# Patient Record
Sex: MALE | Race: WHITE | Employment: OTHER | URBAN - METROPOLITAN AREA
[De-identification: names, ages, dates, MRNs, and addresses within clinical notes are randomized per-mention and may not be internally consistent; named-entity substitution may affect disease eponyms.]

---

## 2019-08-28 ENCOUNTER — HOSPITAL ENCOUNTER (OUTPATIENT)
Dept: GENERAL RADIOLOGY | Age: 69
Discharge: HOME | End: 2019-08-30
Payer: COMMERCIAL

## 2019-08-28 DIAGNOSIS — N63.0 BREAST LUMP: ICD-10-CM

## 2019-08-28 PROCEDURE — G0279 TOMOSYNTHESIS, MAMMO: HCPCS

## 2019-08-28 PROCEDURE — 76642 ULTRASOUND BREAST LIMITED: CPT

## 2019-09-17 ENCOUNTER — OFFICE VISIT (OUTPATIENT)
Dept: ENT CLINIC | Age: 69
End: 2019-09-17
Payer: COMMERCIAL

## 2019-09-17 VITALS
BODY MASS INDEX: 25.57 KG/M2 | DIASTOLIC BLOOD PRESSURE: 64 MMHG | WEIGHT: 162.9 LBS | HEIGHT: 67 IN | SYSTOLIC BLOOD PRESSURE: 116 MMHG | HEART RATE: 74 BPM

## 2019-09-17 DIAGNOSIS — R49.0 HOARSENESS: Primary | ICD-10-CM

## 2019-09-17 PROCEDURE — 99204 OFFICE O/P NEW MOD 45 MIN: CPT | Performed by: OTOLARYNGOLOGY

## 2019-09-17 RX ORDER — AZELASTINE 1 MG/ML
2 SPRAY, METERED NASAL 2 TIMES DAILY
Qty: 1 BOTTLE | Refills: 1 | Status: SHIPPED
Start: 2019-09-17 | End: 2020-06-19

## 2019-09-17 ASSESSMENT — ENCOUNTER SYMPTOMS
FACIAL SWELLING: 0
SINUS PAIN: 0
EYE PAIN: 0
COUGH: 0
COLOR CHANGE: 0
ABDOMINAL DISTENTION: 0
STRIDOR: 0
SHORTNESS OF BREATH: 0
CONSTIPATION: 0
TROUBLE SWALLOWING: 0

## 2020-06-18 ENCOUNTER — HOSPITAL ENCOUNTER (OUTPATIENT)
Age: 70
Discharge: HOME | End: 2020-06-20
Payer: COMMERCIAL

## 2020-06-18 PROCEDURE — U0003 INFECTIOUS AGENT DETECTION BY NUCLEIC ACID (DNA OR RNA); SEVERE ACUTE RESPIRATORY SYNDROME CORONAVIRUS 2 (SARS-COV-2) (CORONAVIRUS DISEASE [COVID-19]), AMPLIFIED PROBE TECHNIQUE, MAKING USE OF HIGH THROUGHPUT TECHNOLOGIES AS DESCRIBED BY CMS-2020-01-R: HCPCS

## 2020-06-19 ENCOUNTER — ANESTHESIA EVENT (OUTPATIENT)
Dept: ENDOSCOPY | Age: 70
End: 2020-06-19
Payer: COMMERCIAL

## 2020-06-19 LAB
SARS-COV-2: NOT DETECTED
SOURCE: NORMAL

## 2020-06-19 RX ORDER — ASCORBIC ACID 500 MG
1000 TABLET ORAL DAILY
COMMUNITY

## 2020-06-22 ENCOUNTER — ANESTHESIA (OUTPATIENT)
Dept: ENDOSCOPY | Age: 70
End: 2020-06-22
Payer: COMMERCIAL

## 2020-06-22 ENCOUNTER — HOSPITAL ENCOUNTER (OUTPATIENT)
Age: 70
Setting detail: OUTPATIENT SURGERY
Discharge: HOME | End: 2020-06-22
Attending: INTERNAL MEDICINE | Admitting: INTERNAL MEDICINE
Payer: COMMERCIAL

## 2020-06-22 VITALS
WEIGHT: 164.25 LBS | RESPIRATION RATE: 16 BRPM | BODY MASS INDEX: 25.78 KG/M2 | HEIGHT: 67 IN | OXYGEN SATURATION: 99 % | DIASTOLIC BLOOD PRESSURE: 64 MMHG | SYSTOLIC BLOOD PRESSURE: 117 MMHG | HEART RATE: 62 BPM | TEMPERATURE: 96.9 F

## 2020-06-22 VITALS
SYSTOLIC BLOOD PRESSURE: 101 MMHG | OXYGEN SATURATION: 99 % | DIASTOLIC BLOOD PRESSURE: 64 MMHG | RESPIRATION RATE: 15 BRPM

## 2020-06-22 PROCEDURE — 7100000010 HC PHASE II RECOVERY - FIRST 15 MIN: Performed by: INTERNAL MEDICINE

## 2020-06-22 PROCEDURE — 7100000011 HC PHASE II RECOVERY - ADDTL 15 MIN: Performed by: INTERNAL MEDICINE

## 2020-06-22 PROCEDURE — 3700000000 HC ANESTHESIA ATTENDED CARE: Performed by: INTERNAL MEDICINE

## 2020-06-22 PROCEDURE — 2580000003 HC RX 258: Performed by: ANESTHESIOLOGY

## 2020-06-22 PROCEDURE — 3609027000 HC COLONOSCOPY: Performed by: INTERNAL MEDICINE

## 2020-06-22 PROCEDURE — 3700000001 HC ADD 15 MINUTES (ANESTHESIA): Performed by: INTERNAL MEDICINE

## 2020-06-22 PROCEDURE — 2709999900 HC NON-CHARGEABLE SUPPLY: Performed by: INTERNAL MEDICINE

## 2020-06-22 PROCEDURE — 6360000002 HC RX W HCPCS: Performed by: NURSE ANESTHETIST, CERTIFIED REGISTERED

## 2020-06-22 RX ORDER — SODIUM CHLORIDE 0.9 % (FLUSH) 0.9 %
10 SYRINGE (ML) INJECTION EVERY 12 HOURS SCHEDULED
Status: DISCONTINUED | OUTPATIENT
Start: 2020-06-22 | End: 2020-06-22 | Stop reason: HOSPADM

## 2020-06-22 RX ORDER — PROPOFOL 10 MG/ML
INJECTION, EMULSION INTRAVENOUS CONTINUOUS PRN
Status: DISCONTINUED | OUTPATIENT
Start: 2020-06-22 | End: 2020-06-22 | Stop reason: SDUPTHER

## 2020-06-22 RX ORDER — SODIUM CHLORIDE, SODIUM LACTATE, POTASSIUM CHLORIDE, CALCIUM CHLORIDE 600; 310; 30; 20 MG/100ML; MG/100ML; MG/100ML; MG/100ML
INJECTION, SOLUTION INTRAVENOUS CONTINUOUS
Status: DISCONTINUED | OUTPATIENT
Start: 2020-06-22 | End: 2020-06-22 | Stop reason: HOSPADM

## 2020-06-22 RX ADMIN — SODIUM CHLORIDE, POTASSIUM CHLORIDE, SODIUM LACTATE AND CALCIUM CHLORIDE: 600; 310; 30; 20 INJECTION, SOLUTION INTRAVENOUS at 08:21

## 2020-06-22 RX ADMIN — PROPOFOL 100 MCG/KG/MIN: 10 INJECTION, EMULSION INTRAVENOUS at 09:36

## 2020-06-22 ASSESSMENT — PAIN SCALES - GENERAL
PAINLEVEL_OUTOF10: 0
PAINLEVEL_OUTOF10: 0

## 2020-06-22 ASSESSMENT — PAIN - FUNCTIONAL ASSESSMENT: PAIN_FUNCTIONAL_ASSESSMENT: 0-10

## 2020-06-22 NOTE — H&P
deficits  Skin:  No rashes, lesions or wounds    DATA:  CBC with Differential:  No results found for: WBC, RBC, HGB, HCT, PLT, MCV, MCH, MCHC, RDW, NRBC, SEGSPCT, BANDSPCT, BLASTSPCT, METASPCT, LYMPHOPCT, PROMYELOPCT, MONOPCT, MYELOPCT, EOSPCT, BASOPCT, MONOSABS, LYMPHSABS, EOSABS, BASOSABS, DIFFTYPE  CMP:  No results found for: NA, K, CL, CO2, BUN, CREATININE, GFRAA, AGRATIO, LABGLOM, GLUCOSE, PROT, LABALBU, CALCIUM, BILITOT, ALKPHOS, AST, ALT  Magnesium:  No results found for: MG  Phosphorus:  No results found for: PHOS  PT/INR:  No results found for: PROTIME, INR  Troponin:  No results found for: TROPONINI  U/A:  No results found for: NITRITE, COLORU, PROTEINU, PHUR, LABCAST, WBCUA, RBCUA, MUCUS, TRICHOMONAS, YEAST, BACTERIA, CLARITYU, SPECGRAV, LEUKOCYTESUR, UROBILINOGEN, BILIRUBINUR, BLOODU, GLUCOSEU, AMORPHOUS  ABG:  No results found for: PH, PCO2, PO2, HCO3, BE, THGB, TCO2, O2SAT  HgBA1c:  No results found for: LABA1C  FLP:  No results found for: TRIG, HDL, LDLCALC, LDLDIRECT, LABVLDL  TSH:  No results found for: TSH  IRON:  No results found for: IRON  LIPASE:  No results found for: LIPASE    ASSESSMENT AND PLAN:      1.   History of tubular adenoma of the colon 2017    Plan:  Colonoscopy with MAC with possible polypectomy        Dominick Cavazos D.O.  6/22/2020  8:57 AM

## 2020-06-22 NOTE — ANESTHESIA PRE PROCEDURE
Department of Anesthesiology  Preprocedure Note       Name:  Kim Suarez   Age:  72 y.o.  :  1950                                          MRN:  [id number]         Date:  2020      Surgeon: Brendon Murray):  Christine Merida DO    Procedure: Procedure(s):  COLONOSCOPY    Medications prior to admission:   Prior to Admission medications    Medication Sig Start Date End Date Taking? Authorizing Provider   vitamin C (ASCORBIC ACID) 500 MG tablet Take 1,000 mg by mouth daily   Yes Historical Provider, MD   Cholecalciferol (VITAMIN D) 2000 UNITS CAPS capsule Take 1 capsule by mouth daily   Yes Historical Provider, MD   Multiple Vitamin (MULTI VITAMIN MENS PO) Take 1 tablet by mouth daily. Yes Historical Provider, MD       Current medications:    Current Facility-Administered Medications   Medication Dose Route Frequency Provider Last Rate Last Dose    sodium chloride flush 0.9 % injection 10 mL  10 mL Intravenous 2 times per day Christine Merida DO        lactated ringers infusion   Intravenous Continuous Loi MD Chantel 100 mL/hr at 20 1221         Allergies: Allergies   Allergen Reactions    Penicillins Nausea And Vomiting     hives       Problem List:  There is no problem list on file for this patient.       Past Medical History:        Diagnosis Date    Gout        Past Surgical History:        Procedure Laterality Date    COLONOSCOPY  1/28/2013     x  3    COLONOSCOPY  [id number] polyp    HERNIA REPAIR Right 2017    inguinal    HERNIA REPAIR      umbilical    KNEE ARTHROSCOPY      both knees       Social History:    Social History     Tobacco Use    Smoking status: Never Smoker    Smokeless tobacco: Never Used   Substance Use Topics    Alcohol use: Yes     Comment: rarely- wine occassionally                                Counseling given: Not Answered      Vital Signs (Current):   Vitals:    20 1151 20 0804   BP:  (!) 141/77   Pulse:  76   Resp:  16 Temp:  97.4 °F (36.3 °C)   TempSrc:  Temporal   SpO2:  100%   Weight: 170 lb (77.1 kg) 164 lb 4 oz (74.5 kg)   Height: 5' 7\" (1.702 m) 5' 7\" (1.702 m)                                              BP Readings from Last 3 Encounters:   06/22/20 (!) 141/77   09/17/19 116/64   08/02/17 120/70       NPO Status: Time of last liquid consumption: 2200                        Time of last solid consumption: 2300                        Date of last liquid consumption: 06/21/20                        Date of last solid food consumption: 06/20/20    BMI:   Wt Readings from Last 3 Encounters:   06/22/20 164 lb 4 oz (74.5 kg)   09/17/19 162 lb 14.4 oz (73.9 kg)   08/02/17 162 lb 8 oz (73.7 kg)     Body mass index is 25.73 kg/m². CBC: No results found for: WBC, RBC, HGB, HCT, MCV, RDW, PLT    CMP: No results found for: NA, K, CL, CO2, BUN, CREATININE, GFRAA, AGRATIO, LABGLOM, GLUCOSE, PROT, CALCIUM, BILITOT, ALKPHOS, AST, ALT    POC Tests: No results for input(s): POCGLU, POCNA, POCK, POCCL, POCBUN, POCHEMO, POCHCT in the last 72 hours.     Coags: No results found for: PROTIME, INR, APTT    HCG (If Applicable): No results found for: PREGTESTUR, PREGSERUM, HCG, HCGQUANT     ABGs: No results found for: PHART, PO2ART, FGD8ZSK, YHT5ULY, BEART, J6GMGFEA     Type & Screen (If Applicable):  No results found for: LABABO, LABRH    Drug/Infectious Status (If Applicable):  No results found for: HIV, HEPCAB    COVID-19 Screening (If Applicable):   Lab Results   Component Value Date    COVID19 Not Detected 06/18/2020         Anesthesia Evaluation  Patient summary reviewed no history of anesthetic complications:   Airway: Mallampati: II  TM distance: >3 FB   Neck ROM: full  Mouth opening: > = 3 FB Dental:          Pulmonary:Negative Pulmonary ROS breath sounds clear to auscultation                             Cardiovascular:  Exercise tolerance: good (>4 METS),         ECG reviewed  Rhythm: regular  Rate: normal                 ROS comment:

## 2021-09-15 ENCOUNTER — HOSPITAL ENCOUNTER (OUTPATIENT)
Dept: ULTRASOUND IMAGING | Age: 71
Discharge: HOME | End: 2021-09-15
Payer: COMMERCIAL

## 2021-09-15 ENCOUNTER — HOSPITAL ENCOUNTER (OUTPATIENT)
Dept: PULMONOLOGY | Age: 71
Discharge: HOME | End: 2021-09-15
Payer: COMMERCIAL

## 2021-09-15 ENCOUNTER — HOSPITAL ENCOUNTER (OUTPATIENT)
Dept: MAMMOGRAPHY | Age: 71
Discharge: HOME | End: 2021-09-17
Payer: COMMERCIAL

## 2021-09-15 VITALS — HEIGHT: 67 IN | BODY MASS INDEX: 25.11 KG/M2 | WEIGHT: 160 LBS

## 2021-09-15 DIAGNOSIS — N63.0 LUMP OR MASS IN BREAST: ICD-10-CM

## 2021-09-15 DIAGNOSIS — N63.0 BREAST LUMP OR MASS: ICD-10-CM

## 2021-09-15 DIAGNOSIS — R06.89 ACUTE RESPIRATORY INSUFFICIENCY: ICD-10-CM

## 2021-09-15 PROCEDURE — 94060 EVALUATION OF WHEEZING: CPT

## 2021-09-15 PROCEDURE — 76642 ULTRASOUND BREAST LIMITED: CPT

## 2021-09-15 PROCEDURE — 94375 RESPIRATORY FLOW VOLUME LOOP: CPT

## 2021-09-15 PROCEDURE — 94726 PLETHYSMOGRAPHY LUNG VOLUMES: CPT

## 2021-09-15 PROCEDURE — 94729 DIFFUSING CAPACITY: CPT

## 2021-09-15 PROCEDURE — G0279 TOMOSYNTHESIS, MAMMO: HCPCS

## 2021-09-16 NOTE — PROCEDURES
[address]                               PULMONARY FUNCTION    PATIENT NAME: Sheila Bermudez                      :        1950  MED REC NO:   [id number]                            ROOM:  ACCOUNT NO:   [de-identified]                           ADMIT DATE: 09/15/2021  PROVIDER:     Annamaria Glynn DO    DATE OF PROCEDURE:  09/15/2021    This is a pulmonary function interpretation by Dr. Chavez Mayes,  pulmonologist, interpreting an outpatient pulmonary function study. REQUESTED BY:   _____    Radha Santos:  The patient is a 80-year-old   male. His height is 5 feet 7 inches (67 inches), weight is 160 pounds,  his BMI of 25 is considering him to be ideal with his body weight. REASON FOR THE PULMONARY FUNCTION STUDY:  There are episodes where he  feels he cannot take a deep breath, so this would go under the category  of dyspnea. QUESTION:  Has he ever had a pulmonary function study in the past?  ANSWER:  Very remotely he states about 40 years ago. So, that is not  going to help us with regards to comparison as it is not available. MEDICATION ALLERGIES:  He apparently has a sensitivity to the antibiotic  PENICILLIN historically. CURRENT MEDICATIONS:  He is on a multivitamin, vitamin C and vitamin D3.    QUESTION:  Was he in a chronic stable state when performing the study. ANSWER:  Yes, he has had no recent community respiratory infections,  which would influence the interpretation of this study. PULMONARY SYMPTOMS:  He denies cough. He denies wheezing. He states  his shortness of breath is only when he notices that he cannot take a  deep breath in; it seems to catch him at times, but he cannot  specifically state what triggers that shortness of breath. CONSTITUTIONAL SYMPTOMS:  He denies any excessive weight loss, fever or  night sweats.     SMOKING HISTORY:  He is a never smoker. WHETHER HE HAS A HISTORY OF ATOPY:  He is nonatopic. PET EXPOSURE:  He has three cats that he is exposed to. ENVIRONMENTAL FACTORS THAT INFLUENCE HIM:  He says the hot weather makes  it difficult for him to take a deep breath in. So, the weather seems to  trigger his symptom of breathlessness. 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 REVIEW OF SYSTEMS:  He states that he does have some chest tightness  episodically. He does get community respiratory infections caused  bronchitis. He does have a history at his age of arthritis and  occasionally notices swelling in his feet. HOME ENVIRONMENT:  As far as his home, he lives in a house with fuel oil  heat. SLEEP HISTORY:  As far as sleep, he has no problems sleeping. FAMILY HISTORY:  Is apparently one where his father had a heart  enlargement. TB HISTORY:  No exposure to TB that he is aware of. INTERPRETATION OF THE PULMONARY FUNCTION STUDY:  SPIROMETRY (Purific[address] 1076):  He is predicted for his  age of 80, and his height of 5 feet 7 inches to have a vital capacity of  3.8 liters on his forced expiratory maneuver. He did actually 4.1  liters, 108% of predicted pre and post bronchodilator. In 1 second of  the 3.8 liters predicted vital capacity, he is predicted at 2.9 liters  in 1 second. He did 3.1 liters, a 108% of predicted. So, no flow  impairment was noted and his FEV1 to FVC ratio was normal.  What we did  see was a little airway reactivity in those small airways and that could  result in his episodes of bronchospasm that might predispose to air  trapping and the inability to take a deep breath. Reactivity is noticed  in the small airways to be remarkable after bronchodilator.     CHEST CAGE MECHANICS:  He has normal chest cage strength in his  diaphragm, expiratory muscles and his ability to persevere with plenty  of breathing reserve with his exercise performances. STATIC LUNG VOLUMES:  He has a normal total lung capacity and no  significant air trapping is noted. GAS TRANSFER:  It is borderline normal at 80%, so not totally normal but  there is a little reduction that could be based on his hemoglobin which  we do not have. If it is diminished, that could result in a little  reduction in his gas transfer. AIRWAY RESISTANCE:  We see just pretty normal airway conductance. FLOW VOLUME CURVE:  There was a little increased concavity to the mid  part of the expiratory flow volume curve and a deeper breath appreciated   after bronchodilator. Conclusions on the patient is that there is a number of  factors that have the potential to predispose to airway reactivity 1)with  his longstanding history of working with isocyanates which is a   small molecular weight compounds that causes sensitization. The patient may be experiencing some small airway reactive airway  dysfunction associated with an acquired type of sensitization to  isocyanates. So this would be treated as if we treated for asthma and  it would be an acquired reactive airway disorder, and I think he would  benefit from therapy for his small airways. There is no signs of any  major impairment in his lung capacity or his flow mechanics and gas  transfer is essentially normal.      So, my impression on the patient here  on my interpretation is that the patient may have some mild airway  reactivity, but it is predominately in the small airways causing his  symptoms of shortness of breath with an inability to take a deep breath  experienced with weather conditions and episodically appreciated to have  some chest tightness. So, my recommendation would be to try on the  patient an ICS/LABA and one can consider any of the ICS/LABAs on the  market and as a dry powder, he can use the Breo Ellipta dry powdered  inhaler. With regards to the aerosols, there is Dulera and Symbicort  and Advair as considerations for use, and I feel that the patient would  benefit from trial of these to determine whether his symptoms are better  controlled and given a trial for at least 4-6 weeks with one of the  ICS/LABA therapy to the airways. Clinical correlation is requested. Thank you for allowing me to co-participate with your patient here in  the interpretation of this pulmonary function study.         Urbano Monday, DO    D: 09/15/2021 16:13:33       T: 09/15/2021 16:17:44     [email or web address]  Job#: [id number]     Doc#: [id number]    CC:

## 2022-02-18 ENCOUNTER — HOSPITAL ENCOUNTER (OUTPATIENT)
Age: 72
Discharge: HOME | End: 2022-02-20
Payer: COMMERCIAL

## 2022-02-18 ENCOUNTER — HOSPITAL ENCOUNTER (OUTPATIENT)
Dept: ULTRASOUND IMAGING | Age: 72
Discharge: HOME | End: 2022-02-18
Payer: COMMERCIAL

## 2022-02-18 ENCOUNTER — HOSPITAL ENCOUNTER (OUTPATIENT)
Dept: GENERAL RADIOLOGY | Age: 72
Discharge: HOME | End: 2022-02-20
Payer: COMMERCIAL

## 2022-02-18 DIAGNOSIS — M25.571 RIGHT ANKLE PAIN, UNSPECIFIED CHRONICITY: ICD-10-CM

## 2022-02-18 DIAGNOSIS — M25.473 EFFUSION OF ANKLE AND FOOT JOINT: ICD-10-CM

## 2022-02-18 DIAGNOSIS — M25.476 EFFUSION OF ANKLE AND FOOT JOINT: ICD-10-CM

## 2022-02-18 DIAGNOSIS — M25.471 RIGHT ANKLE SWELLING: ICD-10-CM

## 2022-02-18 PROCEDURE — 93971 EXTREMITY STUDY: CPT

## 2022-02-18 PROCEDURE — 73610 X-RAY EXAM OF ANKLE: CPT

## 2022-10-31 ENCOUNTER — HOSPITAL ENCOUNTER (OUTPATIENT)
Age: 72
Discharge: HOME | End: 2022-10-31
Payer: COMMERCIAL

## 2022-10-31 LAB
BUN BLDV-MCNC: 18 MG/DL (ref 6–23)
CREAT SERPL-MCNC: 1.2 MG/DL (ref 0.7–1.2)
GFR SERPL CREATININE-BSD FRML MDRD: >60 ML/MIN/1.73

## 2022-10-31 PROCEDURE — 36415 COLL VENOUS BLD VENIPUNCTURE: CPT

## 2022-10-31 PROCEDURE — 82565 ASSAY OF CREATININE: CPT

## 2022-10-31 PROCEDURE — 84520 ASSAY OF UREA NITROGEN: CPT

## 2022-11-07 ENCOUNTER — HOSPITAL ENCOUNTER (OUTPATIENT)
Dept: CT IMAGING | Age: 72
Discharge: HOME | End: 2022-11-07
Payer: COMMERCIAL

## 2022-11-07 DIAGNOSIS — D64.9 ANEMIA, UNSPECIFIED TYPE: ICD-10-CM

## 2022-11-07 DIAGNOSIS — R10.84 ABDOMINAL PAIN, GENERALIZED: ICD-10-CM

## 2022-11-07 PROCEDURE — 6360000004 HC RX CONTRAST MEDICATION: Performed by: RADIOLOGY

## 2022-11-07 PROCEDURE — 74177 CT ABD & PELVIS W/CONTRAST: CPT

## 2022-11-07 RX ADMIN — IOHEXOL 50 ML: 240 INJECTION, SOLUTION INTRATHECAL; INTRAVASCULAR; INTRAVENOUS; ORAL at 19:11

## 2022-11-07 RX ADMIN — IOPAMIDOL 75 ML: 755 INJECTION, SOLUTION INTRAVENOUS at 19:12

## 2022-11-11 ENCOUNTER — HOSPITAL ENCOUNTER (OUTPATIENT)
Age: 72
Discharge: HOME | End: 2022-11-11
Payer: COMMERCIAL

## 2022-11-11 LAB
ALBUMIN SERPL-MCNC: 4 G/DL (ref 3.5–5.2)
ALP BLD-CCNC: 36 U/L (ref 40–129)
ALT SERPL-CCNC: 19 U/L (ref 0–40)
ANION GAP SERPL CALCULATED.3IONS-SCNC: 6 MMOL/L (ref 7–16)
AST SERPL-CCNC: 27 U/L (ref 0–39)
BASOPHILS ABSOLUTE: 0.02 E9/L (ref 0–0.2)
BASOPHILS RELATIVE PERCENT: 0.4 % (ref 0–2)
BILIRUB SERPL-MCNC: 0.5 MG/DL (ref 0–1.2)
BUN BLDV-MCNC: 18 MG/DL (ref 6–23)
CALCIUM SERPL-MCNC: 9.1 MG/DL (ref 8.6–10.2)
CHLORIDE BLD-SCNC: 100 MMOL/L (ref 98–107)
CO2: 31 MMOL/L (ref 22–29)
CREAT SERPL-MCNC: 1.2 MG/DL (ref 0.7–1.2)
EOSINOPHILS ABSOLUTE: 0.05 E9/L (ref 0.05–0.5)
EOSINOPHILS RELATIVE PERCENT: 0.9 % (ref 0–6)
FERRITIN: 18 NG/ML
GFR SERPL CREATININE-BSD FRML MDRD: >60 ML/MIN/1.73
GLUCOSE BLD-MCNC: 101 MG/DL (ref 74–99)
HCT VFR BLD CALC: 37.6 % (ref 37–54)
HEMOGLOBIN: 11.9 G/DL (ref 12.5–16.5)
IMMATURE GRANULOCYTES #: 0.01 E9/L
IMMATURE GRANULOCYTES %: 0.2 % (ref 0–5)
IRON SATURATION: 52 % (ref 20–55)
IRON: 155 MCG/DL (ref 59–158)
LYMPHOCYTES ABSOLUTE: 1.36 E9/L (ref 1.5–4)
LYMPHOCYTES RELATIVE PERCENT: 24.5 % (ref 20–42)
MCH RBC QN AUTO: 26.4 PG (ref 26–35)
MCHC RBC AUTO-ENTMCNC: 31.6 % (ref 32–34.5)
MCV RBC AUTO: 83.4 FL (ref 80–99.9)
MONOCYTES ABSOLUTE: 0.46 E9/L (ref 0.1–0.95)
MONOCYTES RELATIVE PERCENT: 8.3 % (ref 2–12)
NEUTROPHILS ABSOLUTE: 3.64 E9/L (ref 1.8–7.3)
NEUTROPHILS RELATIVE PERCENT: 65.7 % (ref 43–80)
PDW BLD-RTO: 14 FL (ref 11.5–15)
PLATELET # BLD: 196 E9/L (ref 130–450)
PMV BLD AUTO: 10.4 FL (ref 7–12)
POTASSIUM SERPL-SCNC: 4.3 MMOL/L (ref 3.5–5)
RBC # BLD: 4.51 E12/L (ref 3.8–5.8)
SODIUM BLD-SCNC: 137 MMOL/L (ref 132–146)
TOTAL IRON BINDING CAPACITY: 297 MCG/DL (ref 250–450)
TOTAL PROTEIN: 6.2 G/DL (ref 6.4–8.3)
WBC # BLD: 5.5 E9/L (ref 4.5–11.5)

## 2022-11-11 PROCEDURE — 85025 COMPLETE CBC W/AUTO DIFF WBC: CPT

## 2022-11-11 PROCEDURE — 36415 COLL VENOUS BLD VENIPUNCTURE: CPT

## 2022-11-11 PROCEDURE — 83540 ASSAY OF IRON: CPT

## 2022-11-11 PROCEDURE — 83550 IRON BINDING TEST: CPT

## 2022-11-11 PROCEDURE — 80053 COMPREHEN METABOLIC PANEL: CPT

## 2022-11-11 PROCEDURE — 82728 ASSAY OF FERRITIN: CPT

## 2022-11-17 ENCOUNTER — TELEPHONE (OUTPATIENT)
Dept: SURGERY | Age: 72
End: 2022-11-17

## 2022-11-17 ENCOUNTER — INITIAL CONSULT (OUTPATIENT)
Dept: SURGERY | Age: 72
End: 2022-11-17
Payer: COMMERCIAL

## 2022-11-17 VITALS
WEIGHT: 160 LBS | HEIGHT: 67 IN | DIASTOLIC BLOOD PRESSURE: 80 MMHG | RESPIRATION RATE: 18 BRPM | HEART RATE: 87 BPM | BODY MASS INDEX: 25.11 KG/M2 | TEMPERATURE: 98.2 F | SYSTOLIC BLOOD PRESSURE: 129 MMHG

## 2022-11-17 DIAGNOSIS — C16.1 MALIGNANT NEOPLASM OF FUNDUS OF STOMACH (HCC): Primary | ICD-10-CM

## 2022-11-17 PROCEDURE — 99204 OFFICE O/P NEW MOD 45 MIN: CPT | Performed by: SURGERY

## 2022-11-17 PROCEDURE — 1123F ACP DISCUSS/DSCN MKR DOCD: CPT | Performed by: SURGERY

## 2022-11-17 RX ORDER — DOXYCYCLINE HYCLATE 50 MG/1
CAPSULE, GELATIN COATED ORAL
COMMUNITY
Start: 2022-10-18

## 2022-11-17 RX ORDER — PANTOPRAZOLE SODIUM 40 MG/1
TABLET, DELAYED RELEASE ORAL
COMMUNITY
Start: 2022-10-18

## 2022-11-17 NOTE — PROGRESS NOTES
General Surgery History and Physical    Patient's Name/Date of Birth: Dennis Hernandez / 1950    Date: November 17, 2022     Surgeon: Aries Samayoa M.D.    PCP: Kavita Garcia DO     Chief Complaint: stomach cancer    HPI:   Dennis Hernandez is a 71 y.o. male who presents for evaluation of stomach cancer and normal CT scan. Past Medical History:   Diagnosis Date    Gout        Past Surgical History:   Procedure Laterality Date    COLONOSCOPY  1/28/2013     x  3    COLONOSCOPY  [id number] polyp    COLONOSCOPY N/A 6/22/2020    COLONOSCOPY performed by Jayda Sommers DO at [address] [place] [address] Right 08/02/2017    inguinal    HERNIA REPAIR  68/36/5874    umbilical    KNEE ARTHROSCOPY      both knees       Current Outpatient Medications   Medication Sig Dispense Refill    ferrous gluconate (FERGON) 324 (38 Fe) MG tablet TAKE 1 TABLET BY MOUTH ONCE DAILY      pantoprazole (PROTONIX) 40 MG tablet TAKE 1 TABLET BY MOUTH TWICE DAILY      vitamin C (ASCORBIC ACID) 500 MG tablet Take 1,000 mg by mouth daily      Cholecalciferol (VITAMIN D) 2000 UNITS CAPS capsule Take 1 capsule by mouth daily      Multiple Vitamin (MULTI VITAMIN MENS PO) Take 1 tablet by mouth daily. No current facility-administered medications for this visit. Allergies   Allergen Reactions    Penicillins Nausea And Vomiting     hives       The patient has a family history that is negative for severe cardiovascular or respiratory issues, negative for reaction to anesthesia. Social History     Socioeconomic History    Marital status:      Spouse name: Not on file    Number of children: Not on file    Years of education: Not on file    Highest education level: Not on file   Occupational History    Not on file   Tobacco Use    Smoking status: Never    Smokeless tobacco: Never   Vaping Use    Vaping Use: Never used   Substance and Sexual Activity    Alcohol use: Yes     Comment: rarely- wine occassionally    Drug use:  No Sexual activity: Not on file   Other Topics Concern    Not on file   Social History Narrative    Not on file     Social Determinants of Health     Financial Resource Strain: Not on file   Food Insecurity: Not on file   Transportation Needs: Not on file   Physical Activity: Not on file   Stress: Not on file   Social Connections: Not on file   Intimate Partner Violence: Not on file   Housing Stability: Not on file           Review of Systems  Review of Systems -  General ROS: negative for - chills, fatigue or malaise  ENT ROS: negative for - hearing change, nasal congestion or nasal discharge  Allergy and Immunology ROS: negative for - hives, itchy/watery eyes or nasal congestion  Hematological and Lymphatic ROS: negative for - blood clots, blood transfusions, bruising or fatigue  Endocrine ROS: negative for - malaise/lethargy, mood swings, palpitations or polydipsia/polyuria  Respiratory ROS: negative for - sputum changes, stridor, tachypnea or wheezing  Cardiovascular ROS: negative for - irregular heartbeat, loss of consciousness, murmur or orthopnea  Gastrointestinal ROS: negative for - constipation, diarrhea, gas/bloating, heartburn or hematemesis  Genito-Urinary ROS: negative for -  genital discharge, genital ulcers or hematuria  Musculoskeletal ROS: negative for - gait disturbance, muscle pain or muscular weakness    Physical exam:   /80   Pulse 87   Temp 98.2 °F (36.8 °C) (Temporal)   Resp 18   Ht 5' 7\" (1.702 m)   Wt 160 lb (72.6 kg)   BMI 25.06 kg/m²   General appearance:  NAD  Head: NCAT, PERRLA, EOMI, red conjunctiva  Neck: supple, no masses  Lungs: CTAB, equal chest rise bilateral  Heart: Reg rate  Abdomen: soft, nontender, nondistended  Skin; no lesions  Gu: no cva tenderness  Extremities: extremities normal, atraumatic, no cyanosis or edema      Radiology:  CT abdomen/pelvis: no acute pathology    Assessment:  71 y.o. male with stomach cancer    Plan:  Cbc, cmp, coag and will do laparoscopic partial gastrectomy   Discussed the risk, benefits and alternatives of surgery including wound infections, bleeding, scar and hernia formation and the risks of general anesthetic including MI, CVA, sudden death or reactions to anesthetic medications. The patient understands the risks and alternatives and the possibility of converting to an open procedure. All questions were answered to the patient's satisfaction and they freely signed the consent.       Caity Felder MD  10:11 AM  11/17/2022

## 2022-11-17 NOTE — TELEPHONE ENCOUNTER
Per the order of Dr. Jessa Gongora, patient has been scheduled for Lap partial gastretomy on 22. .  Patient instructed to please contact our office with any questions. Procedure scheduled through [facility]. Dr. Jessa Gongora to enter orders. Prior Authorization Form:      DEMOGRAPHICS:                     Patient Name:  Joe Beckwith  Patient :  1950            Insurance:  Payor: Amanda Gibson 150 / Plan: Amanda [employer] - [place] PPO / Product Type: *No Product type* /   Insurance ID Number:    Payer/Plan Subscr  Sex Relation Sub. Ins. ID Effective Group Num   1.  [id number] David Prior 1956 Female Spouse [id number]* 1/1/15 [id number]                                    [id number]         DIAGNOSIS & PROCEDURE:                       Procedure/Operation: Laparoscopic partial gastrectomy           CPT Code: 92438    Diagnosis:  malignant neoplasm of funsus of stomach    ICD10 Code: C16.1    Location:  [facility]    Surgeon:  Finn Rascon INFORMATION:                          Date: 22    Time: TBD              Anesthesia:  General                                                       Status:  Inpatient        Special Comments:  N/A       Electronically signed by Alexis Adams LPN on 58/65/2883 at 12:51 PM

## 2022-11-18 ENCOUNTER — PREP FOR PROCEDURE (OUTPATIENT)
Dept: SURGERY | Age: 72
End: 2022-11-18

## 2022-11-18 RX ORDER — SODIUM CHLORIDE 0.9 % (FLUSH) 0.9 %
5-40 SYRINGE (ML) INJECTION EVERY 12 HOURS SCHEDULED
Status: CANCELLED | OUTPATIENT
Start: 2022-11-18

## 2022-11-18 RX ORDER — SODIUM CHLORIDE 0.9 % (FLUSH) 0.9 %
5-40 SYRINGE (ML) INJECTION PRN
Status: CANCELLED | OUTPATIENT
Start: 2022-11-18

## 2022-11-18 RX ORDER — SODIUM CHLORIDE, SODIUM LACTATE, POTASSIUM CHLORIDE, CALCIUM CHLORIDE 600; 310; 30; 20 MG/100ML; MG/100ML; MG/100ML; MG/100ML
INJECTION, SOLUTION INTRAVENOUS CONTINUOUS
Status: CANCELLED | OUTPATIENT
Start: 2022-11-18

## 2022-11-18 RX ORDER — SODIUM CHLORIDE 9 MG/ML
INJECTION, SOLUTION INTRAVENOUS PRN
Status: CANCELLED | OUTPATIENT
Start: 2022-11-18

## 2022-11-20 ENCOUNTER — ANESTHESIA EVENT (OUTPATIENT)
Dept: OPERATING ROOM | Age: 72
DRG: 328 | End: 2022-11-20
Payer: COMMERCIAL

## 2022-11-20 RX ORDER — LABETALOL HYDROCHLORIDE 5 MG/ML
10 INJECTION, SOLUTION INTRAVENOUS
Status: CANCELLED | OUTPATIENT
Start: 2022-11-20

## 2022-11-20 RX ORDER — IPRATROPIUM BROMIDE AND ALBUTEROL SULFATE 2.5; .5 MG/3ML; MG/3ML
1 SOLUTION RESPIRATORY (INHALATION)
Status: CANCELLED | OUTPATIENT
Start: 2022-11-20 | End: 2022-11-21

## 2022-11-20 RX ORDER — PROCHLORPERAZINE EDISYLATE 5 MG/ML
5 INJECTION INTRAMUSCULAR; INTRAVENOUS
Status: CANCELLED | OUTPATIENT
Start: 2022-11-20 | End: 2022-11-21

## 2022-11-20 RX ORDER — HALOPERIDOL 5 MG/ML
1 INJECTION INTRAMUSCULAR
Status: CANCELLED | OUTPATIENT
Start: 2022-11-20 | End: 2022-11-21

## 2022-11-20 RX ORDER — SODIUM CHLORIDE 9 MG/ML
INJECTION, SOLUTION INTRAVENOUS PRN
Status: CANCELLED | OUTPATIENT
Start: 2022-11-20

## 2022-11-20 RX ORDER — SODIUM CHLORIDE 0.9 % (FLUSH) 0.9 %
5-40 SYRINGE (ML) INJECTION PRN
Status: CANCELLED | OUTPATIENT
Start: 2022-11-20

## 2022-11-20 RX ORDER — DIPHENHYDRAMINE HYDROCHLORIDE 50 MG/ML
12.5 INJECTION INTRAMUSCULAR; INTRAVENOUS
Status: CANCELLED | OUTPATIENT
Start: 2022-11-20 | End: 2022-11-21

## 2022-11-20 RX ORDER — HYDRALAZINE HYDROCHLORIDE 20 MG/ML
10 INJECTION INTRAMUSCULAR; INTRAVENOUS
Status: CANCELLED | OUTPATIENT
Start: 2022-11-20

## 2022-11-20 RX ORDER — SODIUM CHLORIDE 0.9 % (FLUSH) 0.9 %
5-40 SYRINGE (ML) INJECTION EVERY 12 HOURS SCHEDULED
Status: CANCELLED | OUTPATIENT
Start: 2022-11-20

## 2022-11-20 RX ORDER — OXYCODONE HYDROCHLORIDE 5 MG/1
5 TABLET ORAL
Status: CANCELLED | OUTPATIENT
Start: 2022-11-20 | End: 2022-11-21

## 2022-11-20 NOTE — ANESTHESIA PRE PROCEDURE
Department of Anesthesiology  Preprocedure Note       Name:  Dorcas Moura   Age:  71 y.o.  :  1950                                          MRN:  [id number]         Date:  2022      Surgeon: Rosalva Quesada):  Chip Salguero MD    Procedure: Procedure(s):  COLONOSCOPY    Medications prior to admission:   Prior to Admission medications    Medication Sig Start Date End Date Taking? Authorizing Provider   ferrous gluconate (FERGON) 324 (38 Fe) MG tablet TAKE 1 TABLET BY MOUTH ONCE DAILY 10/18/22   Historical Provider, MD   pantoprazole (PROTONIX) 40 MG tablet TAKE 1 TABLET BY MOUTH TWICE DAILY 10/18/22   Historical Provider, MD   vitamin C (ASCORBIC ACID) 500 MG tablet Take 1,000 mg by mouth daily    Historical Provider, MD   Cholecalciferol (VITAMIN D) 2000 UNITS CAPS capsule Take 1 capsule by mouth daily    Historical Provider, MD   Multiple Vitamin (MULTI VITAMIN MENS PO) Take 1 tablet by mouth daily. Historical Provider, MD       Current medications:    Current Outpatient Medications   Medication Sig Dispense Refill    ferrous gluconate (FERGON) 324 (38 Fe) MG tablet TAKE 1 TABLET BY MOUTH ONCE DAILY      pantoprazole (PROTONIX) 40 MG tablet TAKE 1 TABLET BY MOUTH TWICE DAILY      vitamin C (ASCORBIC ACID) 500 MG tablet Take 1,000 mg by mouth daily      Cholecalciferol (VITAMIN D) 2000 UNITS CAPS capsule Take 1 capsule by mouth daily      Multiple Vitamin (MULTI VITAMIN MENS PO) Take 1 tablet by mouth daily. No current facility-administered medications for this visit. Allergies:     Allergies   Allergen Reactions    Penicillins Nausea And Vomiting     hives       Problem List:    Patient Active Problem List   Diagnosis Code    Malignant neoplasm of fundus of stomach (HCC) C16.1       Past Medical History:        Diagnosis Date    Gout        Past Surgical History:        Procedure Laterality Date    COLONOSCOPY  1/28/2013     x  3    COLONOSCOPY  [id number] polyp    COLONOSCOPY N/A 6/22/2020    COLONOSCOPY performed by Ian De La Paz DO at [address] Right 08/02/2017    inguinal    HERNIA REPAIR  21/38/6253    umbilical    KNEE ARTHROSCOPY      both knees       Social History:    Social History     Tobacco Use    Smoking status: Never    Smokeless tobacco: Never   Substance Use Topics    Alcohol use: Yes     Comment: rarely- wine occassionally                                Counseling given: Not Answered      Vital Signs (Current): There were no vitals filed for this visit. BP Readings from Last 3 Encounters:   11/17/22 129/80   06/22/20 101/64   06/22/20 117/64       NPO Status:                                                                                 BMI:   Wt Readings from Last 3 Encounters:   11/17/22 160 lb (72.6 kg)   09/15/21 160 lb (72.6 kg)   06/22/20 164 lb 4 oz (74.5 kg)     There is no height or weight on file to calculate BMI.    CBC:   Lab Results   Component Value Date/Time    WBC 5.5 11/11/2022 03:49 PM    RBC 4.51 11/11/2022 03:49 PM    HGB 11.9 11/11/2022 03:49 PM    HCT 37.6 11/11/2022 03:49 PM    MCV 83.4 11/11/2022 03:49 PM    RDW 14.0 11/11/2022 03:49 PM     11/11/2022 03:49 PM       CMP:   Lab Results   Component Value Date/Time     11/11/2022 03:49 PM    K 4.3 11/11/2022 03:49 PM     11/11/2022 03:49 PM    CO2 31 11/11/2022 03:49 PM    BUN 18 11/11/2022 03:49 PM    CREATININE 1.2 11/11/2022 03:49 PM    LABGLOM >60 11/11/2022 03:49 PM    GLUCOSE 101 11/11/2022 03:49 PM    PROT 6.2 11/11/2022 03:49 PM    CALCIUM 9.1 11/11/2022 03:49 PM    BILITOT 0.5 11/11/2022 03:49 PM    ALKPHOS 36 11/11/2022 03:49 PM    AST 27 11/11/2022 03:49 PM    ALT 19 11/11/2022 03:49 PM       POC Tests: No results for input(s): POCGLU, POCNA, POCK, POCCL, POCBUN, POCHEMO, POCHCT in the last 72 hours.     Coags: No results found for: PROTIME, INR, APTT    HCG (If Applicable): No results found for: PREGTESTUR, PREGSERUM, HCG, HCGQUANT     ABGs: No results found for: PHART, PO2ART, RUG1BNX, EDD8ACH, BEART, K5PMOOXV     Type & Screen (If Applicable):  No results found for: LABABO, LABRH    Drug/Infectious Status (If Applicable):  No results found for: HIV, HEPCAB    COVID-19 Screening (If Applicable):   Lab Results   Component Value Date/Time    COVID19 Not Detected 06/18/2020 08:50 AM         Anesthesia Evaluation  Patient summary reviewed no history of anesthetic complications:   Airway: Mallampati: I  TM distance: >3 FB   Neck ROM: full  Mouth opening: > = 3 FB   Dental: normal exam         Pulmonary:Negative Pulmonary ROS breath sounds clear to auscultation                             Cardiovascular:  Exercise tolerance: good (>4 METS),         ECG reviewed  Rhythm: regular  Rate: normal                 ROS comment: EKG 2017:  Normal sinus rhythm  Normal ECG  No previous ECGs available  Confirmed by Marichuy rCaft ([id number]) on 7/31/2017 2:32:41 PM     Neuro/Psych:   Negative Neuro/Psych ROS              GI/Hepatic/Renal: Neg GI/Hepatic/Renal ROS  (+) GERD:,           Endo/Other:    (+) blood dyscrasia ( Fe deficiency anemia): anemia:., malignancy/cancer ( Malignant neoplasm of fundus of stomach ). ROS comment: Gout Abdominal:             Vascular: negative vascular ROS. Other Findings:             Anesthesia Plan      general     ASA 2       Induction: intravenous. MIPS: Postoperative opioids intended and Prophylactic antiemetics administered. Anesthetic plan and risks discussed with patient. Plan discussed with CRNA.                     Norlene Bamberger, MD   84-01-21

## 2022-11-21 NOTE — PROGRESS NOTES
[address] [facility]                                                                                                                    PRE OP INSTRUCTIONS FOR  Min Angeles        Date: 11/21/2022    Date of surgery: 11/22/2022   Arrival Time: Hospital will call you between 5pm and 7pm with your final arrival time for surgery    Do not eat or drink anything after 12 am prior to surgery. This includes no water, chewing gum, mints or ice chips. Take the following medications with a small sip of water on the morning of Surgery: protonix     Diabetics may take evening dose of insulin but none after midnight. If you feel symptomatic or low blood sugar morning of surgery drink 1-2 ounces of apple juice only. Aspirin, Ibuprofen, Advil, Naproxen, Vitamin E and other Anti-inflammatory products should be stopped  before surgery  as directed by your physician. Take Tylenol only unless instructed otherwise by your surgeon. Check with your Doctor regarding stopping Plavix, Coumadin, Lovenox, Eliquis, Effient, or other blood thinners. Do not smoke,use illicit drugs and do not drink any alcoholic beverages 24 hours prior to surgery. You may brush your teeth the morning of surgery. DO NOT SWALLOW WATER    You MUST make arrangements for a responsible adult to take you home after your surgery. You will not be allowed to leave alone or drive yourself home. It is strongly suggested someone stay with you the first 24 hrs. Your surgery will be cancelled if you do not have a ride home. PEDIATRIC PATIENTS ONLY:  A parent/legal guardian must accompany a child scheduled for surgery and plan to stay at the hospital until the child is discharged. Please do not bring other children with you.     Please wear simple, loose fitting clothing to the hospital.  Gopalmann Luevano not bring valuables (money, credit cards, checkbooks, etc.) Do not wear any makeup (including no eye makeup) or nail polish on your fingers or toes.    DO NOT wear any jewelry or piercings on day of surgery. All body piercing jewelry must be removed. Shower the night before surgery with ___Antibacterial soap /DARYN WIPES________    TOTAL JOINT REPLACEMENT/HYSTERECTOMY PATIENTS ONLY---Remember to bring Blood Bank bracelet to the hospital on the day of surgery. If you have a Living Will and Durable Power of  for Healthcare, please bring in a copy. If appropriate bring crutches, inspirex, WALKER, CANE etc... Notify your Surgeon if you develop any illness between now and surgery time, cough, cold, fever, sore throat, nausea, vomiting, etc.  Please notify your surgeon if you experience dizziness, shortness of breath or blurred vision between now & the time of your surgery. If you have ___dentures, they will be removed before going to the OR; we will provide you a container. If you wear ___contact lenses or ___glasses, they will be removed; please bring a case for them. To provide excellent care visitors will be limited to 2 in the room at any given time. Please bring picture ID and insurance card. Sleep apnea patients need to bring CPAP AND SETTINGS to hospital on day of surgery. During flu season no children under the age of 15 are permitted in the hospital for the safety of all patients. Other                  Please call AMBULATORY CARE if you have any further questions.    [address] [facility]     [address]

## 2022-11-22 ENCOUNTER — ANESTHESIA (OUTPATIENT)
Dept: OPERATING ROOM | Age: 72
DRG: 328 | End: 2022-11-22
Payer: COMMERCIAL

## 2022-11-22 ENCOUNTER — HOSPITAL ENCOUNTER (INPATIENT)
Age: 72
LOS: 2 days | Discharge: HOME | DRG: 328 | End: 2022-11-24
Attending: SURGERY | Admitting: SURGERY
Payer: COMMERCIAL

## 2022-11-22 DIAGNOSIS — C16.1 MALIGNANT NEOPLASM OF FUNDUS OF STOMACH (HCC): ICD-10-CM

## 2022-11-22 DIAGNOSIS — Z90.3 S/P SUBTOTAL GASTRECTOMY: Primary | ICD-10-CM

## 2022-11-22 PROCEDURE — 2580000003 HC RX 258

## 2022-11-22 PROCEDURE — 2500000003 HC RX 250 WO HCPCS: Performed by: SURGERY

## 2022-11-22 PROCEDURE — 6370000000 HC RX 637 (ALT 250 FOR IP): Performed by: SURGERY

## 2022-11-22 PROCEDURE — 1200000000 HC SEMI PRIVATE

## 2022-11-22 PROCEDURE — 6360000002 HC RX W HCPCS: Performed by: SURGERY

## 2022-11-22 PROCEDURE — 2580000003 HC RX 258: Performed by: SURGERY

## 2022-11-22 PROCEDURE — 88341 IMHCHEM/IMCYTCHM EA ADD ANTB: CPT

## 2022-11-22 PROCEDURE — 3700000001 HC ADD 15 MINUTES (ANESTHESIA): Performed by: SURGERY

## 2022-11-22 PROCEDURE — 2709999900 HC NON-CHARGEABLE SUPPLY: Performed by: SURGERY

## 2022-11-22 PROCEDURE — 2700000000 HC OXYGEN THERAPY PER DAY

## 2022-11-22 PROCEDURE — 6360000002 HC RX W HCPCS: Performed by: ANESTHESIOLOGY

## 2022-11-22 PROCEDURE — 99024 POSTOP FOLLOW-UP VISIT: CPT | Performed by: SURGERY

## 2022-11-22 PROCEDURE — 6360000002 HC RX W HCPCS

## 2022-11-22 PROCEDURE — 0DB64ZZ EXCISION OF STOMACH, PERCUTANEOUS ENDOSCOPIC APPROACH: ICD-10-PCS | Performed by: SURGERY

## 2022-11-22 PROCEDURE — 3600000002 HC SURGERY LEVEL 2 BASE: Performed by: SURGERY

## 2022-11-22 PROCEDURE — 7100000001 HC PACU RECOVERY - ADDTL 15 MIN: Performed by: SURGERY

## 2022-11-22 PROCEDURE — 88309 TISSUE EXAM BY PATHOLOGIST: CPT

## 2022-11-22 PROCEDURE — 2500000003 HC RX 250 WO HCPCS

## 2022-11-22 PROCEDURE — 88342 IMHCHEM/IMCYTCHM 1ST ANTB: CPT

## 2022-11-22 PROCEDURE — 0BQT4ZZ REPAIR DIAPHRAGM, PERCUTANEOUS ENDOSCOPIC APPROACH: ICD-10-PCS | Performed by: SURGERY

## 2022-11-22 PROCEDURE — 3700000000 HC ANESTHESIA ATTENDED CARE: Performed by: SURGERY

## 2022-11-22 PROCEDURE — A4216 STERILE WATER/SALINE, 10 ML: HCPCS | Performed by: SURGERY

## 2022-11-22 PROCEDURE — 3600000012 HC SURGERY LEVEL 2 ADDTL 15MIN: Performed by: SURGERY

## 2022-11-22 PROCEDURE — C9113 INJ PANTOPRAZOLE SODIUM, VIA: HCPCS | Performed by: SURGERY

## 2022-11-22 PROCEDURE — 7100000000 HC PACU RECOVERY - FIRST 15 MIN: Performed by: SURGERY

## 2022-11-22 PROCEDURE — 2720000010 HC SURG SUPPLY STERILE: Performed by: SURGERY

## 2022-11-22 PROCEDURE — 0DJ08ZZ INSPECTION OF UPPER INTESTINAL TRACT, VIA NATURAL OR ARTIFICIAL OPENING ENDOSCOPIC: ICD-10-PCS | Performed by: SURGERY

## 2022-11-22 RX ORDER — PROPOFOL 10 MG/ML
INJECTION, EMULSION INTRAVENOUS PRN
Status: DISCONTINUED | OUTPATIENT
Start: 2022-11-22 | End: 2022-11-22 | Stop reason: SDUPTHER

## 2022-11-22 RX ORDER — SODIUM CHLORIDE 0.9 % (FLUSH) 0.9 %
5-40 SYRINGE (ML) INJECTION EVERY 12 HOURS SCHEDULED
Status: DISCONTINUED | OUTPATIENT
Start: 2022-11-22 | End: 2022-11-24 | Stop reason: HOSPADM

## 2022-11-22 RX ORDER — KETOROLAC TROMETHAMINE 30 MG/ML
15 INJECTION, SOLUTION INTRAMUSCULAR; INTRAVENOUS ONCE
Status: COMPLETED | OUTPATIENT
Start: 2022-11-22 | End: 2022-11-22

## 2022-11-22 RX ORDER — SODIUM CHLORIDE 9 MG/ML
INJECTION, SOLUTION INTRAVENOUS PRN
Status: DISCONTINUED | OUTPATIENT
Start: 2022-11-22 | End: 2022-11-22 | Stop reason: HOSPADM

## 2022-11-22 RX ORDER — SODIUM CHLORIDE 0.9 % (FLUSH) 0.9 %
5-40 SYRINGE (ML) INJECTION PRN
Status: DISCONTINUED | OUTPATIENT
Start: 2022-11-22 | End: 2022-11-22 | Stop reason: HOSPADM

## 2022-11-22 RX ORDER — OXYCODONE HYDROCHLORIDE 5 MG/1
5 TABLET ORAL
Status: DISCONTINUED | OUTPATIENT
Start: 2022-11-22 | End: 2022-11-22 | Stop reason: HOSPADM

## 2022-11-22 RX ORDER — ONDANSETRON 2 MG/ML
INJECTION INTRAMUSCULAR; INTRAVENOUS PRN
Status: DISCONTINUED | OUTPATIENT
Start: 2022-11-22 | End: 2022-11-22 | Stop reason: SDUPTHER

## 2022-11-22 RX ORDER — MORPHINE SULFATE 4 MG/ML
4 INJECTION, SOLUTION INTRAMUSCULAR; INTRAVENOUS
Status: DISCONTINUED | OUTPATIENT
Start: 2022-11-22 | End: 2022-11-24 | Stop reason: HOSPADM

## 2022-11-22 RX ORDER — CIPROFLOXACIN 2 MG/ML
INJECTION, SOLUTION INTRAVENOUS PRN
Status: DISCONTINUED | OUTPATIENT
Start: 2022-11-22 | End: 2022-11-22 | Stop reason: SDUPTHER

## 2022-11-22 RX ORDER — SODIUM CHLORIDE 0.9 % (FLUSH) 0.9 %
5-40 SYRINGE (ML) INJECTION EVERY 12 HOURS SCHEDULED
Status: DISCONTINUED | OUTPATIENT
Start: 2022-11-22 | End: 2022-11-22 | Stop reason: HOSPADM

## 2022-11-22 RX ORDER — FENTANYL CITRATE 50 UG/ML
INJECTION, SOLUTION INTRAMUSCULAR; INTRAVENOUS PRN
Status: DISCONTINUED | OUTPATIENT
Start: 2022-11-22 | End: 2022-11-22 | Stop reason: SDUPTHER

## 2022-11-22 RX ORDER — CIPROFLOXACIN 2 MG/ML
400 INJECTION, SOLUTION INTRAVENOUS EVERY 12 HOURS
Status: COMPLETED | OUTPATIENT
Start: 2022-11-22 | End: 2022-11-22

## 2022-11-22 RX ORDER — MORPHINE SULFATE 2 MG/ML
2 INJECTION, SOLUTION INTRAMUSCULAR; INTRAVENOUS
Status: DISCONTINUED | OUTPATIENT
Start: 2022-11-22 | End: 2022-11-24 | Stop reason: HOSPADM

## 2022-11-22 RX ORDER — DIPHENHYDRAMINE HYDROCHLORIDE 50 MG/ML
12.5 INJECTION INTRAMUSCULAR; INTRAVENOUS
Status: DISCONTINUED | OUTPATIENT
Start: 2022-11-22 | End: 2022-11-22 | Stop reason: HOSPADM

## 2022-11-22 RX ORDER — SODIUM CHLORIDE 0.9 % (FLUSH) 0.9 %
5-40 SYRINGE (ML) INJECTION PRN
Status: DISCONTINUED | OUTPATIENT
Start: 2022-11-22 | End: 2022-11-24 | Stop reason: HOSPADM

## 2022-11-22 RX ORDER — TRAMADOL HYDROCHLORIDE 50 MG/1
50 TABLET ORAL EVERY 6 HOURS PRN
Status: DISCONTINUED | OUTPATIENT
Start: 2022-11-22 | End: 2022-11-24 | Stop reason: HOSPADM

## 2022-11-22 RX ORDER — PROCHLORPERAZINE EDISYLATE 5 MG/ML
5 INJECTION INTRAMUSCULAR; INTRAVENOUS
Status: DISCONTINUED | OUTPATIENT
Start: 2022-11-22 | End: 2022-11-22 | Stop reason: HOSPADM

## 2022-11-22 RX ORDER — SODIUM CHLORIDE, SODIUM LACTATE, POTASSIUM CHLORIDE, CALCIUM CHLORIDE 600; 310; 30; 20 MG/100ML; MG/100ML; MG/100ML; MG/100ML
INJECTION, SOLUTION INTRAVENOUS CONTINUOUS
Status: DISCONTINUED | OUTPATIENT
Start: 2022-11-22 | End: 2022-11-24 | Stop reason: HOSPADM

## 2022-11-22 RX ORDER — HALOPERIDOL 5 MG/ML
1 INJECTION INTRAMUSCULAR
Status: DISCONTINUED | OUTPATIENT
Start: 2022-11-22 | End: 2022-11-22 | Stop reason: HOSPADM

## 2022-11-22 RX ORDER — LABETALOL HYDROCHLORIDE 5 MG/ML
10 INJECTION, SOLUTION INTRAVENOUS
Status: DISCONTINUED | OUTPATIENT
Start: 2022-11-22 | End: 2022-11-22 | Stop reason: HOSPADM

## 2022-11-22 RX ORDER — ONDANSETRON 2 MG/ML
4 INJECTION INTRAMUSCULAR; INTRAVENOUS EVERY 6 HOURS PRN
Status: DISCONTINUED | OUTPATIENT
Start: 2022-11-22 | End: 2022-11-24 | Stop reason: HOSPADM

## 2022-11-22 RX ORDER — MEPERIDINE HYDROCHLORIDE 25 MG/ML
25 INJECTION INTRAMUSCULAR; INTRAVENOUS; SUBCUTANEOUS ONCE
Status: COMPLETED | OUTPATIENT
Start: 2022-11-22 | End: 2022-11-22

## 2022-11-22 RX ORDER — MIDAZOLAM HYDROCHLORIDE 1 MG/ML
INJECTION INTRAMUSCULAR; INTRAVENOUS PRN
Status: DISCONTINUED | OUTPATIENT
Start: 2022-11-22 | End: 2022-11-22 | Stop reason: SDUPTHER

## 2022-11-22 RX ORDER — MEPERIDINE HYDROCHLORIDE 25 MG/ML
INJECTION INTRAMUSCULAR; INTRAVENOUS; SUBCUTANEOUS
Status: COMPLETED
Start: 2022-11-22 | End: 2022-11-22

## 2022-11-22 RX ORDER — HYDRALAZINE HYDROCHLORIDE 20 MG/ML
10 INJECTION INTRAMUSCULAR; INTRAVENOUS
Status: DISCONTINUED | OUTPATIENT
Start: 2022-11-22 | End: 2022-11-22 | Stop reason: HOSPADM

## 2022-11-22 RX ORDER — LIDOCAINE HYDROCHLORIDE 20 MG/ML
INJECTION, SOLUTION INTRAVENOUS PRN
Status: DISCONTINUED | OUTPATIENT
Start: 2022-11-22 | End: 2022-11-22 | Stop reason: SDUPTHER

## 2022-11-22 RX ORDER — ONDANSETRON 4 MG/1
4 TABLET, ORALLY DISINTEGRATING ORAL EVERY 8 HOURS PRN
Status: DISCONTINUED | OUTPATIENT
Start: 2022-11-22 | End: 2022-11-24 | Stop reason: HOSPADM

## 2022-11-22 RX ORDER — KETOROLAC TROMETHAMINE 30 MG/ML
INJECTION, SOLUTION INTRAMUSCULAR; INTRAVENOUS PRN
Status: DISCONTINUED | OUTPATIENT
Start: 2022-11-22 | End: 2022-11-22 | Stop reason: SDUPTHER

## 2022-11-22 RX ORDER — DEXAMETHASONE SODIUM PHOSPHATE 10 MG/ML
INJECTION, SOLUTION INTRAMUSCULAR; INTRAVENOUS PRN
Status: DISCONTINUED | OUTPATIENT
Start: 2022-11-22 | End: 2022-11-22 | Stop reason: SDUPTHER

## 2022-11-22 RX ORDER — KETOROLAC TROMETHAMINE 15 MG/ML
15 INJECTION, SOLUTION INTRAMUSCULAR; INTRAVENOUS EVERY 6 HOURS
Status: COMPLETED | OUTPATIENT
Start: 2022-11-22 | End: 2022-11-23

## 2022-11-22 RX ORDER — TRAMADOL HYDROCHLORIDE 50 MG/1
25 TABLET ORAL EVERY 6 HOURS PRN
Status: DISCONTINUED | OUTPATIENT
Start: 2022-11-22 | End: 2022-11-24 | Stop reason: HOSPADM

## 2022-11-22 RX ORDER — SODIUM CHLORIDE, SODIUM LACTATE, POTASSIUM CHLORIDE, CALCIUM CHLORIDE 600; 310; 30; 20 MG/100ML; MG/100ML; MG/100ML; MG/100ML
INJECTION, SOLUTION INTRAVENOUS CONTINUOUS
Status: DISCONTINUED | OUTPATIENT
Start: 2022-11-22 | End: 2022-11-22 | Stop reason: HOSPADM

## 2022-11-22 RX ORDER — GLYCOPYRROLATE 0.2 MG/ML
INJECTION INTRAMUSCULAR; INTRAVENOUS PRN
Status: DISCONTINUED | OUTPATIENT
Start: 2022-11-22 | End: 2022-11-22 | Stop reason: SDUPTHER

## 2022-11-22 RX ORDER — SODIUM CHLORIDE, SODIUM LACTATE, POTASSIUM CHLORIDE, CALCIUM CHLORIDE 600; 310; 30; 20 MG/100ML; MG/100ML; MG/100ML; MG/100ML
INJECTION, SOLUTION INTRAVENOUS CONTINUOUS PRN
Status: DISCONTINUED | OUTPATIENT
Start: 2022-11-22 | End: 2022-11-22 | Stop reason: SDUPTHER

## 2022-11-22 RX ORDER — SODIUM CHLORIDE 9 MG/ML
INJECTION, SOLUTION INTRAVENOUS PRN
Status: DISCONTINUED | OUTPATIENT
Start: 2022-11-22 | End: 2022-11-24 | Stop reason: HOSPADM

## 2022-11-22 RX ORDER — ROCURONIUM BROMIDE 10 MG/ML
INJECTION, SOLUTION INTRAVENOUS PRN
Status: DISCONTINUED | OUTPATIENT
Start: 2022-11-22 | End: 2022-11-22 | Stop reason: SDUPTHER

## 2022-11-22 RX ORDER — NEOSTIGMINE METHYLSULFATE 1 MG/ML
INJECTION, SOLUTION INTRAVENOUS PRN
Status: DISCONTINUED | OUTPATIENT
Start: 2022-11-22 | End: 2022-11-22 | Stop reason: SDUPTHER

## 2022-11-22 RX ORDER — BUPIVACAINE HYDROCHLORIDE AND EPINEPHRINE 2.5; 5 MG/ML; UG/ML
INJECTION, SOLUTION EPIDURAL; INFILTRATION; INTRACAUDAL; PERINEURAL PRN
Status: DISCONTINUED | OUTPATIENT
Start: 2022-11-22 | End: 2022-11-22 | Stop reason: ALTCHOICE

## 2022-11-22 RX ORDER — IPRATROPIUM BROMIDE AND ALBUTEROL SULFATE 2.5; .5 MG/3ML; MG/3ML
1 SOLUTION RESPIRATORY (INHALATION)
Status: DISCONTINUED | OUTPATIENT
Start: 2022-11-22 | End: 2022-11-22 | Stop reason: HOSPADM

## 2022-11-22 RX ORDER — PROMETHAZINE HYDROCHLORIDE 25 MG/ML
6.25 INJECTION, SOLUTION INTRAMUSCULAR; INTRAVENOUS EVERY 6 HOURS PRN
Status: DISCONTINUED | OUTPATIENT
Start: 2022-11-22 | End: 2022-11-24 | Stop reason: HOSPADM

## 2022-11-22 RX ADMIN — LIDOCAINE HYDROCHLORIDE 80 MG: 20 INJECTION, SOLUTION INTRAVENOUS at 09:24

## 2022-11-22 RX ADMIN — PHENYLEPHRINE HYDROCHLORIDE 200 MCG: 10 INJECTION INTRAVENOUS at 10:36

## 2022-11-22 RX ADMIN — MEPERIDINE HYDROCHLORIDE 25 MG: 25 INJECTION INTRAMUSCULAR; INTRAVENOUS; SUBCUTANEOUS at 12:25

## 2022-11-22 RX ADMIN — Medication 10 ML: at 21:33

## 2022-11-22 RX ADMIN — PHENYLEPHRINE HYDROCHLORIDE 200 MCG: 10 INJECTION INTRAVENOUS at 09:40

## 2022-11-22 RX ADMIN — GLYCOPYRROLATE 0.6 MG: 0.2 INJECTION, SOLUTION INTRAMUSCULAR; INTRAVENOUS at 11:04

## 2022-11-22 RX ADMIN — ONDANSETRON 4 MG: 2 INJECTION INTRAMUSCULAR; INTRAVENOUS at 22:00

## 2022-11-22 RX ADMIN — SODIUM CHLORIDE, POTASSIUM CHLORIDE, SODIUM LACTATE AND CALCIUM CHLORIDE: 600; 310; 30; 20 INJECTION, SOLUTION INTRAVENOUS at 22:03

## 2022-11-22 RX ADMIN — PROMETHAZINE HYDROCHLORIDE 6.25 MG: 25 INJECTION INTRAMUSCULAR; INTRAVENOUS at 17:49

## 2022-11-22 RX ADMIN — KETOROLAC TROMETHAMINE 15 MG: 30 INJECTION, SOLUTION INTRAMUSCULAR at 11:04

## 2022-11-22 RX ADMIN — SODIUM CHLORIDE, POTASSIUM CHLORIDE, SODIUM LACTATE AND CALCIUM CHLORIDE: 600; 310; 30; 20 INJECTION, SOLUTION INTRAVENOUS at 13:26

## 2022-11-22 RX ADMIN — CIPROFLOXACIN 400 MG: 2 INJECTION, SOLUTION INTRAVENOUS at 21:32

## 2022-11-22 RX ADMIN — DEXAMETHASONE SODIUM PHOSPHATE 10 MG: 10 INJECTION INTRAMUSCULAR; INTRAVENOUS at 09:34

## 2022-11-22 RX ADMIN — ROCURONIUM BROMIDE 10 MG: 10 INJECTION, SOLUTION INTRAVENOUS at 10:23

## 2022-11-22 RX ADMIN — HYDROMORPHONE HYDROCHLORIDE 0.5 MG: 1 INJECTION, SOLUTION INTRAMUSCULAR; INTRAVENOUS; SUBCUTANEOUS at 12:05

## 2022-11-22 RX ADMIN — HYDROMORPHONE HYDROCHLORIDE 0.5 MG: 1 INJECTION, SOLUTION INTRAMUSCULAR; INTRAVENOUS; SUBCUTANEOUS at 11:58

## 2022-11-22 RX ADMIN — FENTANYL CITRATE 25 MCG: 50 INJECTION, SOLUTION INTRAMUSCULAR; INTRAVENOUS at 11:16

## 2022-11-22 RX ADMIN — SODIUM CHLORIDE, POTASSIUM CHLORIDE, SODIUM LACTATE AND CALCIUM CHLORIDE: 600; 310; 30; 20 INJECTION, SOLUTION INTRAVENOUS at 09:20

## 2022-11-22 RX ADMIN — SODIUM CHLORIDE, POTASSIUM CHLORIDE, SODIUM LACTATE AND CALCIUM CHLORIDE: 600; 310; 30; 20 INJECTION, SOLUTION INTRAVENOUS at 07:36

## 2022-11-22 RX ADMIN — KETOROLAC TROMETHAMINE 15 MG: 15 INJECTION, SOLUTION INTRAMUSCULAR; INTRAVENOUS at 16:52

## 2022-11-22 RX ADMIN — ONDANSETRON 4 MG: 2 INJECTION INTRAMUSCULAR; INTRAVENOUS at 09:39

## 2022-11-22 RX ADMIN — CIPROFLOXACIN 400 MG: 2 INJECTION, SOLUTION INTRAVENOUS at 09:32

## 2022-11-22 RX ADMIN — MIDAZOLAM 2 MG: 1 INJECTION INTRAMUSCULAR; INTRAVENOUS at 09:20

## 2022-11-22 RX ADMIN — FENTANYL CITRATE 50 MCG: 50 INJECTION, SOLUTION INTRAMUSCULAR; INTRAVENOUS at 09:24

## 2022-11-22 RX ADMIN — PROPOFOL 170 MG: 10 INJECTION, EMULSION INTRAVENOUS at 09:24

## 2022-11-22 RX ADMIN — SODIUM CHLORIDE 40 MG: 9 INJECTION, SOLUTION INTRAMUSCULAR; INTRAVENOUS; SUBCUTANEOUS at 13:27

## 2022-11-22 RX ADMIN — SODIUM CHLORIDE, POTASSIUM CHLORIDE, SODIUM LACTATE AND CALCIUM CHLORIDE: 600; 310; 30; 20 INJECTION, SOLUTION INTRAVENOUS at 10:22

## 2022-11-22 RX ADMIN — PHENYLEPHRINE HYDROCHLORIDE 200 MCG: 10 INJECTION INTRAVENOUS at 10:32

## 2022-11-22 RX ADMIN — PROPOFOL 30 MG: 10 INJECTION, EMULSION INTRAVENOUS at 10:55

## 2022-11-22 RX ADMIN — Medication 3 MG: at 11:04

## 2022-11-22 RX ADMIN — FENTANYL CITRATE 25 MCG: 50 INJECTION, SOLUTION INTRAMUSCULAR; INTRAVENOUS at 10:59

## 2022-11-22 RX ADMIN — ONDANSETRON 4 MG: 2 INJECTION INTRAMUSCULAR; INTRAVENOUS at 15:20

## 2022-11-22 RX ADMIN — FENTANYL CITRATE 50 MCG: 50 INJECTION, SOLUTION INTRAMUSCULAR; INTRAVENOUS at 09:36

## 2022-11-22 RX ADMIN — TRAMADOL HYDROCHLORIDE 50 MG: 50 TABLET, COATED ORAL at 21:53

## 2022-11-22 RX ADMIN — KETOROLAC TROMETHAMINE 15 MG: 15 INJECTION, SOLUTION INTRAMUSCULAR; INTRAVENOUS at 23:45

## 2022-11-22 RX ADMIN — ROCURONIUM BROMIDE 40 MG: 10 INJECTION, SOLUTION INTRAVENOUS at 09:24

## 2022-11-22 RX ADMIN — KETOROLAC TROMETHAMINE 30 MG: 30 INJECTION, SOLUTION INTRAMUSCULAR at 11:04

## 2022-11-22 RX ADMIN — TRAMADOL HYDROCHLORIDE 25 MG: 50 TABLET, COATED ORAL at 13:40

## 2022-11-22 RX ADMIN — PHENYLEPHRINE HYDROCHLORIDE 100 MCG: 10 INJECTION INTRAVENOUS at 09:38

## 2022-11-22 ASSESSMENT — PAIN SCALES - GENERAL
PAINLEVEL_OUTOF10: 7
PAINLEVEL_OUTOF10: 5
PAINLEVEL_OUTOF10: 6
PAINLEVEL_OUTOF10: 0
PAINLEVEL_OUTOF10: 7
PAINLEVEL_OUTOF10: 3
PAINLEVEL_OUTOF10: 7
PAINLEVEL_OUTOF10: 4

## 2022-11-22 ASSESSMENT — PAIN DESCRIPTION - LOCATION
LOCATION: ABDOMEN

## 2022-11-22 ASSESSMENT — PAIN DESCRIPTION - DESCRIPTORS
DESCRIPTORS: ACHING
DESCRIPTORS: ACHING;DISCOMFORT
DESCRIPTORS: TENDER;SORE;DISCOMFORT
DESCRIPTORS: ACHING
DESCRIPTORS: ACHING
DESCRIPTORS: ACHING;DISCOMFORT

## 2022-11-22 ASSESSMENT — PAIN - FUNCTIONAL ASSESSMENT
PAIN_FUNCTIONAL_ASSESSMENT: 0-10
PAIN_FUNCTIONAL_ASSESSMENT: ACTIVITIES ARE NOT PREVENTED

## 2022-11-22 ASSESSMENT — PAIN DESCRIPTION - PAIN TYPE
TYPE: SURGICAL PAIN
TYPE: SURGICAL PAIN

## 2022-11-22 ASSESSMENT — PAIN DESCRIPTION - ORIENTATION: ORIENTATION: MID;LEFT;RIGHT

## 2022-11-22 NOTE — ANESTHESIA POSTPROCEDURE EVALUATION
Department of Anesthesiology  Postprocedure Note    Patient: Stephanie Rhoades  MRN: [id number]  YOB: 1950  Date of evaluation: 11/22/2022      Procedure Summary     Date: 11/22/22 Room / Location: [facility] / [address] [facility]    Anesthesia Start: Dolores Olivaressant Anesthesia Stop: 1665    Procedure: LAPAROSCOPIC PARTIAL GASTRECTOMY Diagnosis:       Malignant neoplasm of fundus of stomach ([address]      (Malignant neoplasm of fundus of stomach ([address] [C16.1])    Surgeons: Kemar Whitley MD Responsible Provider: Carmen Bragg MD    Anesthesia Type: general ASA Status: 2          Anesthesia Type: No value filed.     Kasie Phase I: Kasie Score: 9    Kasie Phase II:        Anesthesia Post Evaluation    Patient location during evaluation: PACU  Patient participation: complete - patient participated  Level of consciousness: awake  Pain score: 3  Airway patency: patent  Nausea & Vomiting: no nausea and no vomiting  Complications: no  Cardiovascular status: hemodynamically stable  Respiratory status: acceptable  Hydration status: euvolemic

## 2022-11-22 NOTE — CONSULTS
Department of Internal Medicine        HISTORY OF PRESENT ILLNESS:      The patient is a 71 y.o. male who presents with having a laparoscopic partial gastrectomy/subtotal with Ellis-en-Y reconstruction. Patient has a history of iron deficiency anemia so a outpatient EGD was performed in October and showed stomach cancer-malignant neoplasm in the fundus of the stomach-normal CT scan November 7, 2022. Patient is seen postop. Patient with expected postop discomfort. He denies any problems any chest pain, dizziness or unusual shortness of breath. Past Medical History:    Past Medical History:   Diagnosis Date    Gout     Stomach cancer [facility])      Past Surgical History:    Past Surgical History:   Procedure Laterality Date    COLONOSCOPY  01/28/2013     x  3    COLONOSCOPY  [id number] polyp    COLONOSCOPY N/A 06/22/2020    COLONOSCOPY performed by Rico Barnes DO at [address] [place] [address], COLON, DIAGNOSTIC      HERNIA REPAIR Right 08/02/2017    inguinal    HERNIA REPAIR  72/21/6968    umbilical    KNEE ARTHROSCOPY      both knees       Medications Prior to Admission:    @  Prior to Admission medications    Medication Sig Start Date End Date Taking? Authorizing Provider   ferrous gluconate (FERGON) 324 (38 Fe) MG tablet TAKE 1 TABLET BY MOUTH ONCE DAILY 10/18/22   Historical Provider, MD   pantoprazole (PROTONIX) 40 MG tablet TAKE 1 TABLET BY MOUTH TWICE DAILY 10/18/22   Historical Provider, MD   vitamin C (ASCORBIC ACID) 500 MG tablet Take 500 mg by mouth daily    Historical Provider, MD   Cholecalciferol (VITAMIN D) 2000 UNITS CAPS capsule Take 1 capsule by mouth daily    Historical Provider, MD   Multiple Vitamin (MULTI VITAMIN MENS PO) Take 1 tablet by mouth daily.     Historical Provider, MD       Allergies:  Penicillins    Social History:   Social History     Socioeconomic History    Marital status:      Spouse name: Not on file    Number of children: Not on file    Years of education: Not on file    Highest education level: Not on file   Occupational History    Not on file   Tobacco Use    Smoking status: Never    Smokeless tobacco: Never   Vaping Use    Vaping Use: Never used   Substance and Sexual Activity    Alcohol use: Yes     Comment: rarely- wine occassionally    Drug use: No    Sexual activity: Not on file   Other Topics Concern    Not on file   Social History Narrative    Not on file     Social Determinants of Health     Financial Resource Strain: Not on file   Food Insecurity: Not on file   Transportation Needs: Not on file   Physical Activity: Not on file   Stress: Not on file   Social Connections: Not on file   Intimate Partner Violence: Not on file   Housing Stability: Not on file       Family History:   History reviewed. No pertinent family history. REVIEW OF SYSTEMS:    Gen: Patient denies any lightheadedness or dizziness. No LOC or syncope. No fevers or chills. HEENT: No earache, sore throat or nasal congestion. Resp: Denies cough, hemoptysis or sputum production. Cardiac: Denies chest pain, SOB, diaphoresis or palpitations. GI: + Occasional heartburn. No nausea, vomiting, diarrhea or constipation. No melena or hematochezia. : No urinary complaints, dysuria, hematuria or frequency. MSK: No extremity weakness, paralysis or paresthesias. PHYSICAL EXAM:    Vitals:  /73   Pulse 82   Temp 97.9 °F (36.6 °C)   Resp 15   Ht 5' 7\" (1.702 m)   Wt 158 lb (71.7 kg)   SpO2 100%   BMI 24.75 kg/m²     General:  This is a 71 y.o. yo male who is alert and oriented in expected postop discomfort  HEENT:  Head is normocephalic and atraumatic, PERRLA, EOMI, mucus membranes moist with no pharyngeal erythema or exudate. Neck:  Supple with no carotid bruits, JVD or thyromegaly.   No cervical adenopathy  CV:  Regular rate and rhythm, 3-5/6 systolic murmurs  Lungs:  Clear to auscultation bilaterally with no wheezes, rales or rhonchi  Abdomen:  + Postop abdomen, distended, bowel sounds present  Extremities:  No edema, peripheral pulses intact bilaterally  Neuro:  Cranial nerves II-XII grossly intact; motor and sensory function intact with no focal deficits  Skin:  No rashes, lesions or wounds      DATA:  CBC with Differential:    Lab Results   Component Value Date/Time    WBC 5.5 11/11/2022 03:49 PM    RBC 4.51 11/11/2022 03:49 PM    HGB 11.9 11/11/2022 03:49 PM    HCT 37.6 11/11/2022 03:49 PM     11/11/2022 03:49 PM    MCV 83.4 11/11/2022 03:49 PM    MCH 26.4 11/11/2022 03:49 PM    MCHC 31.6 11/11/2022 03:49 PM    RDW 14.0 11/11/2022 03:49 PM    LYMPHOPCT 24.5 11/11/2022 03:49 PM    MONOPCT 8.3 11/11/2022 03:49 PM    BASOPCT 0.4 11/11/2022 03:49 PM    MONOSABS 0.46 11/11/2022 03:49 PM    LYMPHSABS 1.36 11/11/2022 03:49 PM    EOSABS 0.05 11/11/2022 03:49 PM    BASOSABS 0.02 11/11/2022 03:49 PM     CMP:    Lab Results   Component Value Date/Time     11/11/2022 03:49 PM    K 4.3 11/11/2022 03:49 PM     11/11/2022 03:49 PM    CO2 31 11/11/2022 03:49 PM    BUN 18 11/11/2022 03:49 PM    CREATININE 1.2 11/11/2022 03:49 PM    LABGLOM >60 11/11/2022 03:49 PM    GLUCOSE 101 11/11/2022 03:49 PM    PROT 6.2 11/11/2022 03:49 PM    LABALBU 4.0 11/11/2022 03:49 PM    CALCIUM 9.1 11/11/2022 03:49 PM    BILITOT 0.5 11/11/2022 03:49 PM    ALKPHOS 36 11/11/2022 03:49 PM    AST 27 11/11/2022 03:49 PM    ALT 19 11/11/2022 03:49 PM     Magnesium:  No results found for: MG  Phosphorus:  No results found for: PHOS  PT/INR:  No results found for: PROTIME, INR  Troponin:  No results found for: TROPONINI  U/A:  No results found for: NITRITE, COLORU, PROTEINU, PHUR, LABCAST, WBCUA, RBCUA, MUCUS, TRICHOMONAS, YEAST, BACTERIA, CLARITYU, SPECGRAV, LEUKOCYTESUR, UROBILINOGEN, BILIRUBINUR, BLOODU, GLUCOSEU, AMORPHOUS  ABG:  No results found for: PH, PCO2, PO2, HCO3, BE, THGB, TCO2, O2SAT  HgBA1c:  No results found for: LABA1C  FLP:  No results found for: TRIG, HDL, LDLCALC, LDLDIRECT, LABVLDL  TSH:  No results found for: TSH  IRON:    Lab Results   Component Value Date/Time    IRON 155 11/11/2022 03:49 PM     LIPASE:  No results found for: LIPASE    ASSESSMENT AND PLAN:      Patient Active Problem List    Diagnosis Date Noted    S/P subtotal gastrectomy 11/22/2022    Malignant neoplasm of fundus of stomach ([facility] [facility] 11/17/2022     Impression:  1. Fundus stomach cancer-EGD October 2022  2. Status post subtotal gastrectomy 11/22/2022    Plan:  Patient admitted to the medical surgical floor  Home medications reviewed  Monitor heart rate, blood pressure, O2 saturations  Diet per general surgery  IV fluids lactated Ringer's 100 cc an hour  Protonix 40 mg IV push daily  Abdominal pain meds per general surgery    CMP, CBC in a.m.       Ian De La Paz DO, D.OLuz Maria  11/22/2022  5:05 PM

## 2022-11-22 NOTE — PLAN OF CARE
Problem: Discharge Planning  Goal: Discharge to home or other facility with appropriate resources  Outcome: Progressing  Flowsheets (Taken 11/22/2022 1319)  Discharge to home or other facility with appropriate resources: Identify barriers to discharge with patient and caregiver

## 2022-11-22 NOTE — OP NOTE
Operative Note      Patient: Nik Code  YOB: 1950  MRN: [id number]    Date of Procedure: 11/22/2022    Pre-Op Diagnosis: Malignant neoplasm of fundus of stomach ([facility] [address] [C16.1]    Post-Op Diagnosis: Same       Procedure(s):  LAPAROSCOPIC PARTIAL GASTRECTOMY subtotal with olimpia en y reconstruction    Surgeon(s):  Carlos Ozuna MD    Assistant:   First Assistant: Jenny Bennett    Anesthesia: General    Estimated Blood Loss (mL): less than 50     Complications: None    Specimens:   ID Type Source Tests Collected by Time Destination   A : PORTION OF STOMACH  Tissue Tissue SURGICAL PATHOLOGY Carlos Ozuna MD 11/22/2022 1057        Implants:  * No implants in log *      Drains: * No LDAs found *    Findings: as dictated    Detailed Description of Procedure:   [id number]    Electronically signed by Carlos Ozuna MD on 11/22/2022 at 11:10 AM

## 2022-11-22 NOTE — H&P
General Surgery History and Physical     Patient's Name/Date of Birth: Rupesh Delgado / 1950     Date: November 22, 2022      Surgeon: Yanelis Ashley M.D.     PCP: Jacky Donovan DO     Chief Complaint: stomach cancer     HPI:   Rupesh Delgado is a 71 y.o. male who presents for evaluation of stomach cancer and normal CT scan. Past Medical History        Past Medical History:   Diagnosis Date    Gout              Past Surgical History         Past Surgical History:   Procedure Laterality Date    COLONOSCOPY   1/28/2013      x  3    COLONOSCOPY   [id number] polyp    COLONOSCOPY N/A 6/22/2020     COLONOSCOPY performed by Ganesh Montgomery DO at [address] Right 08/02/2017     inguinal    HERNIA REPAIR   71/18/8501     umbilical    KNEE ARTHROSCOPY         both knees            Current Facility-Administered Medications          Current Outpatient Medications   Medication Sig Dispense Refill    ferrous gluconate (FERGON) 324 (38 Fe) MG tablet TAKE 1 TABLET BY MOUTH ONCE DAILY        pantoprazole (PROTONIX) 40 MG tablet TAKE 1 TABLET BY MOUTH TWICE DAILY        vitamin C (ASCORBIC ACID) 500 MG tablet Take 1,000 mg by mouth daily        Cholecalciferol (VITAMIN D) 2000 UNITS CAPS capsule Take 1 capsule by mouth daily        Multiple Vitamin (MULTI VITAMIN MENS PO) Take 1 tablet by mouth daily. No current facility-administered medications for this visit. Allergies   Allergen Reactions    Penicillins Nausea And Vomiting       hives         The patient has a family history that is negative for severe cardiovascular or respiratory issues, negative for reaction to anesthesia.      Social History               Socioeconomic History    Marital status:        Spouse name: Not on file    Number of children: Not on file    Years of education: Not on file    Highest education level: Not on file   Occupational History    Not on file   Tobacco Use    Smoking status: Never Smokeless tobacco: Never   Vaping Use    Vaping Use: Never used   Substance and Sexual Activity    Alcohol use: Yes       Comment: rarely- wine occassionally    Drug use: No    Sexual activity: Not on file   Other Topics Concern    Not on file   Social History Narrative    Not on file      Social Determinants of Health      Financial Resource Strain: Not on file   Food Insecurity: Not on file   Transportation Needs: Not on file   Physical Activity: Not on file   Stress: Not on file   Social Connections: Not on file   Intimate Partner Violence: Not on file   Housing Stability: Not on file                  Review of Systems  Review of Systems -  General ROS: negative for - chills, fatigue or malaise  ENT ROS: negative for - hearing change, nasal congestion or nasal discharge  Allergy and Immunology ROS: negative for - hives, itchy/watery eyes or nasal congestion  Hematological and Lymphatic ROS: negative for - blood clots, blood transfusions, bruising or fatigue  Endocrine ROS: negative for - malaise/lethargy, mood swings, palpitations or polydipsia/polyuria  Respiratory ROS: negative for - sputum changes, stridor, tachypnea or wheezing  Cardiovascular ROS: negative for - irregular heartbeat, loss of consciousness, murmur or orthopnea  Gastrointestinal ROS: negative for - constipation, diarrhea, gas/bloating, heartburn or hematemesis  Genito-Urinary ROS: negative for -  genital discharge, genital ulcers or hematuria  Musculoskeletal ROS: negative for - gait disturbance, muscle pain or muscular weakness     Physical exam:   /80   Pulse 87   Temp 98.2 °F (36.8 °C) (Temporal)   Resp 18   Ht 5' 7\" (1.702 m)   Wt 160 lb (72.6 kg)   BMI 25.06 kg/m²   General appearance:  NAD  Head: NCAT, PERRLA, EOMI, red conjunctiva  Neck: supple, no masses  Lungs: CTAB, equal chest rise bilateral  Heart: Reg rate  Abdomen: soft, nontender, nondistended  Skin; no lesions  Gu: no cva tenderness  Extremities: extremities normal, atraumatic, no cyanosis or edema        Radiology:  CT abdomen/pelvis: no acute pathology     Assessment:  71 y.o. male with stomach cancer     Plan:  Cbc, cmp, coag and will do laparoscopic partial gastrectomy   Discussed the risk, benefits and alternatives of surgery including wound infections, bleeding, scar and hernia formation and the risks of general anesthetic including MI, CVA, sudden death or reactions to anesthetic medications. The patient understands the risks and alternatives and the possibility of converting to an open procedure. All questions were answered to the patient's satisfaction and they freely signed the consent.         Yelena Dyson MD

## 2022-11-23 LAB
ALBUMIN SERPL-MCNC: 3.5 G/DL (ref 3.5–5.2)
ALP BLD-CCNC: 36 U/L (ref 40–129)
ALT SERPL-CCNC: 60 U/L (ref 0–40)
ANION GAP SERPL CALCULATED.3IONS-SCNC: 10 MMOL/L (ref 7–16)
AST SERPL-CCNC: 60 U/L (ref 0–39)
BASOPHILS ABSOLUTE: 0 E9/L (ref 0–0.2)
BASOPHILS RELATIVE PERCENT: 0 % (ref 0–2)
BILIRUB SERPL-MCNC: 0.6 MG/DL (ref 0–1.2)
BUN BLDV-MCNC: 17 MG/DL (ref 6–23)
CALCIUM SERPL-MCNC: 8.6 MG/DL (ref 8.6–10.2)
CHLORIDE BLD-SCNC: 99 MMOL/L (ref 98–107)
CO2: 27 MMOL/L (ref 22–29)
CREAT SERPL-MCNC: 1.2 MG/DL (ref 0.7–1.2)
EOSINOPHILS ABSOLUTE: 0 E9/L (ref 0.05–0.5)
EOSINOPHILS RELATIVE PERCENT: 0 % (ref 0–6)
GFR SERPL CREATININE-BSD FRML MDRD: >60 ML/MIN/1.73
GLUCOSE BLD-MCNC: 130 MG/DL (ref 74–99)
HCT VFR BLD CALC: 36.1 % (ref 37–54)
HEMOGLOBIN: 11.8 G/DL (ref 12.5–16.5)
LIPASE: 17 U/L (ref 13–60)
LYMPHOCYTES ABSOLUTE: 0.24 E9/L (ref 1.5–4)
LYMPHOCYTES RELATIVE PERCENT: 2 % (ref 20–42)
MCH RBC QN AUTO: 26.9 PG (ref 26–35)
MCHC RBC AUTO-ENTMCNC: 32.7 % (ref 32–34.5)
MCV RBC AUTO: 82.2 FL (ref 80–99.9)
MONOCYTES ABSOLUTE: 0.12 E9/L (ref 0.1–0.95)
MONOCYTES RELATIVE PERCENT: 1 % (ref 2–12)
NEUTROPHILS ABSOLUTE: 11.74 E9/L (ref 1.8–7.3)
NEUTROPHILS RELATIVE PERCENT: 97 % (ref 43–80)
PDW BLD-RTO: 14.1 FL (ref 11.5–15)
PLATELET # BLD: 212 E9/L (ref 130–450)
PMV BLD AUTO: 9.6 FL (ref 7–12)
POTASSIUM REFLEX MAGNESIUM: 4.5 MMOL/L (ref 3.5–5)
RBC # BLD: 4.39 E12/L (ref 3.8–5.8)
RBC # BLD: NORMAL 10*6/UL
SODIUM BLD-SCNC: 136 MMOL/L (ref 132–146)
TOTAL PROTEIN: 5.7 G/DL (ref 6.4–8.3)
WBC # BLD: 12.1 E9/L (ref 4.5–11.5)

## 2022-11-23 PROCEDURE — 85025 COMPLETE CBC W/AUTO DIFF WBC: CPT

## 2022-11-23 PROCEDURE — 6360000002 HC RX W HCPCS: Performed by: SURGERY

## 2022-11-23 PROCEDURE — A4216 STERILE WATER/SALINE, 10 ML: HCPCS | Performed by: SURGERY

## 2022-11-23 PROCEDURE — C9113 INJ PANTOPRAZOLE SODIUM, VIA: HCPCS | Performed by: SURGERY

## 2022-11-23 PROCEDURE — 6370000000 HC RX 637 (ALT 250 FOR IP): Performed by: SURGERY

## 2022-11-23 PROCEDURE — 1200000000 HC SEMI PRIVATE

## 2022-11-23 PROCEDURE — 36415 COLL VENOUS BLD VENIPUNCTURE: CPT

## 2022-11-23 PROCEDURE — 83690 ASSAY OF LIPASE: CPT

## 2022-11-23 PROCEDURE — 2580000003 HC RX 258: Performed by: SURGERY

## 2022-11-23 PROCEDURE — 80053 COMPREHEN METABOLIC PANEL: CPT

## 2022-11-23 RX ADMIN — KETOROLAC TROMETHAMINE 15 MG: 15 INJECTION, SOLUTION INTRAMUSCULAR; INTRAVENOUS at 17:30

## 2022-11-23 RX ADMIN — SODIUM CHLORIDE 40 MG: 9 INJECTION, SOLUTION INTRAMUSCULAR; INTRAVENOUS; SUBCUTANEOUS at 08:56

## 2022-11-23 RX ADMIN — PROMETHAZINE HYDROCHLORIDE 6.25 MG: 25 INJECTION INTRAMUSCULAR; INTRAVENOUS at 04:48

## 2022-11-23 RX ADMIN — KETOROLAC TROMETHAMINE 15 MG: 15 INJECTION, SOLUTION INTRAMUSCULAR; INTRAVENOUS at 12:29

## 2022-11-23 RX ADMIN — ONDANSETRON 4 MG: 2 INJECTION INTRAMUSCULAR; INTRAVENOUS at 15:47

## 2022-11-23 RX ADMIN — TRAMADOL HYDROCHLORIDE 50 MG: 50 TABLET, COATED ORAL at 19:12

## 2022-11-23 RX ADMIN — SODIUM CHLORIDE, POTASSIUM CHLORIDE, SODIUM LACTATE AND CALCIUM CHLORIDE: 600; 310; 30; 20 INJECTION, SOLUTION INTRAVENOUS at 15:51

## 2022-11-23 RX ADMIN — SODIUM CHLORIDE, POTASSIUM CHLORIDE, SODIUM LACTATE AND CALCIUM CHLORIDE: 600; 310; 30; 20 INJECTION, SOLUTION INTRAVENOUS at 06:54

## 2022-11-23 RX ADMIN — KETOROLAC TROMETHAMINE 15 MG: 15 INJECTION, SOLUTION INTRAMUSCULAR; INTRAVENOUS at 04:47

## 2022-11-23 ASSESSMENT — PAIN DESCRIPTION - DESCRIPTORS: DESCRIPTORS: ACHING

## 2022-11-23 ASSESSMENT — PAIN DESCRIPTION - LOCATION: LOCATION: RIB CAGE

## 2022-11-23 ASSESSMENT — PAIN SCALES - GENERAL
PAINLEVEL_OUTOF10: 3
PAINLEVEL_OUTOF10: 7
PAINLEVEL_OUTOF10: 3

## 2022-11-23 ASSESSMENT — PAIN DESCRIPTION - ORIENTATION: ORIENTATION: RIGHT

## 2022-11-23 NOTE — PROGRESS NOTES
Department of Internal Medicine        HISTORY OF PRESENT ILLNESS:      The patient is a 71 y.o. male who presents with having a laparoscopic partial gastrectomy/subtotal with Ellis-en-Y reconstruction. Patient has a history of iron deficiency anemia so a outpatient EGD was performed in October and showed stomach cancer-malignant neoplasm in the fundus of the stomach-normal CT scan November 7, 2022. Patient is seen postop. Patient with expected postop discomfort. He denies any problems any chest pain, dizziness or unusual shortness of breath. 11/23/2022  Patient seen examined on medical surgical floor. Patient has expected postop discomfort. Patient denies any chest pain, dizziness, nausea/vomiting. The patient is starting to pass gas rectally. BUN/creatinine was 17/1.2 with normal electrolytes. Patient has mild ovation transaminase. WBC 12.1 hemoglobin 11.8. Temperature 98.4 with heart rate about 100 and blood pressure 132/77. O2 sat 98% on 3 L nasal cannula. Urinary output ranges 200-350 cc a shift. Nursing to check O2 saturation at rest and with activity on room air. Past Medical History:    Past Medical History:   Diagnosis Date    Gout     Stomach cancer [facility])      Past Surgical History:    Past Surgical History:   Procedure Laterality Date    COLONOSCOPY  01/28/2013     x  3    COLONOSCOPY  [id number] polyp    COLONOSCOPY N/A 06/22/2020    COLONOSCOPY performed by Agustina Dugan DO at [address] [facility], COLON, DIAGNOSTIC      GASTRECTOMY N/A 11/22/2022    LAPAROSCOPIC PARTIAL GASTRECTOMY performed by Everton Grossman MD at [address] [facility] Right 08/02/2017    inguinal    HERNIA REPAIR  33/07/4221    umbilical    KNEE ARTHROSCOPY      both knees       Medications Prior to Admission:    @  Prior to Admission medications    Medication Sig Start Date End Date Taking?  Authorizing Provider   ferrous gluconate (FERGON) 324 (38 Fe) MG tablet TAKE 1 TABLET BY MOUTH ONCE DAILY 10/18/22   Historical Provider, MD   pantoprazole (PROTONIX) 40 MG tablet TAKE 1 TABLET BY MOUTH TWICE DAILY 10/18/22   Historical Provider, MD   vitamin C (ASCORBIC ACID) 500 MG tablet Take 500 mg by mouth daily    Historical Provider, MD   Cholecalciferol (VITAMIN D) 2000 UNITS CAPS capsule Take 1 capsule by mouth daily    Historical Provider, MD   Multiple Vitamin (MULTI VITAMIN MENS PO) Take 1 tablet by mouth daily. Historical Provider, MD       Allergies:  Penicillins    Social History:   Social History     Socioeconomic History    Marital status:      Spouse name: Not on file    Number of children: Not on file    Years of education: Not on file    Highest education level: Not on file   Occupational History    Not on file   Tobacco Use    Smoking status: Never    Smokeless tobacco: Never   Vaping Use    Vaping Use: Never used   Substance and Sexual Activity    Alcohol use: Yes     Comment: rarely- wine occassionally    Drug use: No    Sexual activity: Not on file   Other Topics Concern    Not on file   Social History Narrative    Not on file     Social Determinants of Health     Financial Resource Strain: Not on file   Food Insecurity: Not on file   Transportation Needs: Not on file   Physical Activity: Not on file   Stress: Not on file   Social Connections: Not on file   Intimate Partner Violence: Not on file   Housing Stability: Not on file       Family History:   History reviewed. No pertinent family history. REVIEW OF SYSTEMS:    Gen: Patient denies any lightheadedness or dizziness. No LOC or syncope. No fevers or chills. HEENT: No earache, sore throat or nasal congestion. Resp: Denies cough, hemoptysis or sputum production. Cardiac: Denies chest pain, SOB, diaphoresis or palpitations. GI: + Occasional heartburn. No nausea, vomiting, diarrhea or constipation. No melena or hematochezia. : No urinary complaints, dysuria, hematuria or frequency.     MSK: No extremity weakness, 11/23/2022 04:36 AM    ALKPHOS 36 11/23/2022 04:36 AM    AST 60 11/23/2022 04:36 AM    ALT 60 11/23/2022 04:36 AM     Magnesium:  No results found for: MG  Phosphorus:  No results found for: PHOS  PT/INR:  No results found for: PROTIME, INR  Troponin:  No results found for: TROPONINI  U/A:  No results found for: NITRITE, COLORU, PROTEINU, PHUR, LABCAST, WBCUA, RBCUA, MUCUS, TRICHOMONAS, YEAST, BACTERIA, CLARITYU, SPECGRAV, LEUKOCYTESUR, UROBILINOGEN, BILIRUBINUR, BLOODU, GLUCOSEU, AMORPHOUS  ABG:  No results found for: PH, PCO2, PO2, HCO3, BE, THGB, TCO2, O2SAT  HgBA1c:  No results found for: LABA1C  FLP:  No results found for: TRIG, HDL, LDLCALC, LDLDIRECT, LABVLDL  TSH:  No results found for: TSH  IRON:    Lab Results   Component Value Date/Time    IRON 155 11/11/2022 03:49 PM     LIPASE:    Lab Results   Component Value Date/Time    LIPASE 17 11/23/2022 04:36 AM       ASSESSMENT AND PLAN:      Patient Active Problem List    Diagnosis Date Noted    S/P subtotal gastrectomy 11/22/2022    Malignant neoplasm of fundus of stomach ([facility] [address] 11/17/2022     Impression:  1. Fundus stomach cancer-EGD October 2022  2. Status post subtotal gastrectomy 11/22/2022    Plan:  Patient admitted to the medical surgical floor  Home medications reviewed  Monitor heart rate, blood pressure, O2 saturations  Diet per general surgery  IV fluids lactated Ringer's 100 cc an hour  Protonix 40 mg IV push daily  Abdominal pain meds per general surgery    O2 saturation on room air at rest and with activity    If O2 saturations are normal with activity patient can be discharged home when okay with general surgery. CMP, CBC in a.m.       Ian De La Paz DO, D.OLuz Maria  11/23/2022  9:10 AM

## 2022-11-23 NOTE — OP NOTE
trocar was put in place. An  additional 5-mm trocar was placed in the left lateral abdomen. In the  right lateral abdomen, there were 5 and 10-mm trocars placed. A  Bernardino liver retractor was placed in the epigastrium. Once this was  done, we were able to retract the liver up. We did an EGD to see that  the lesion of note was in the very distal aspect of the stomach and the  lesser curvature. We were able to dissect down underneath the pylorus  and dissect to the first portion of the duodenum. We fired across this  with the [place] stapler blue load. Once this was done, we mobilized  the entire stomach, greater omentum, and gastrohepatic ligament taking  everything down including the left gastric down to its origin and the  remaining lymphatic tissue in that area. We removed all the way up to  approximately 4 cm away from the gastroesophageal border. We then  stapled across this with the [place] 60 stapler leaving a large portion  of stomach pouch above. We had seen that there was a large hiatal  hernia. We dissected circumferentially around this freeing it up all  the way up into the mediastinum. There was muscle disruption of the  crura on each side. We sewed this close and then mobilized and placed  the falciform ligament underneath this to repair the muscle disruption  in the area. Once this was done, we counted down 40 cm from the  ligament of Treitz, brought this up, and sewed it to the remaining  gastric pouch. We did an [place] stapled side-to-side anastomosis and  closed the common gastrectomy with that same suture we brought it up  with. Once this was completed, we then ran down 40 cm. We stapled  across the biliary limb proximal to the stomach there and then counted  down 40 cm on the alimentary limb. We then did a triple staple  technique Ellis-en-Y reconstruction of the jejunum at that area and  closed the common enterotomy with the stapler.   We closed the mesenteric  defect with absorbable V-Loc sutures. We then made the 10-mm trocar  site in the right lateral abdomen larger and placed a wound protector. We delivered the specimen through this, opened on the back table to see  that we had several centimeters distal to it of margin to the tumor. Once this was done, we closed the fascia at that site with #1 PDS  suture. We suction irrigated out the abdomen till the aspirate returned  clear. We mobilized with electrocautery the remaining fatty tissue in  that area and did a supportive patch over the gastrojejunal anastomosis. Once this was completed, the abdomen suction irrigated out until the  aspirate returned clear. All trocars and pneumoperitoneum were  evacuated. The trocar sites were closed with 4-0 Monocryl and surgical  glue and the patient was woken up in stable condition.         Paula Bennett MD    D: 11/22/2022 11:16:15       T: 11/22/2022 11:19:50     [email or web address]  Job#: [id number]     Doc#: [id number]    CC:

## 2022-11-23 NOTE — ACP (ADVANCE CARE PLANNING)
Advance Care Planning   Healthcare Decision Maker:    Primary Decision Maker: Cesar Summers Spouse - [phone]

## 2022-11-23 NOTE — PLAN OF CARE
Problem: Discharge Planning  Goal: Discharge to home or other facility with appropriate resources  11/23/2022 0944 by Helen Rodriguez RN  Outcome: Progressing     Problem: Pain  Goal: Verbalizes/displays adequate comfort level or baseline comfort level  11/23/2022 0944 by Helen Rodriguez RN  Outcome: Progressing     Problem: Safety - Adult  Goal: Free from fall injury  11/23/2022 0944 by Helen Rodriguez RN  Outcome: Progressing     Problem: ABCDS Injury Assessment  Goal: Absence of physical injury  11/23/2022 0944 by Helen Rodriguez RN  Outcome: Progressing     Problem: Gastrointestinal - Adult  Goal: Minimal or absence of nausea and vomiting  Outcome: Progressing  Goal: Maintains or returns to baseline bowel function  Outcome: Progressing  Goal: Maintains adequate nutritional intake  Outcome: Progressing

## 2022-11-23 NOTE — PROGRESS NOTES
GENERAL SURGERY  DAILY PROGRESS NOTE  11/23/2022    No chief complaint on file. Subjective:  Pt states he is having mild abdominal in the middle of his abdomen. Reports nausea and burping, but no vomiting. States he had one episode of flatus, but no BM.     Objective:  /77   Pulse (!) 101   Temp 98.4 °F (36.9 °C) (Oral)   Resp 16   Ht 5' 7\" (1.702 m)   Wt 158 lb (71.7 kg)   SpO2 98%   BMI 24.75 kg/m²     GENERAL:  Laying in bed, awake, alert, cooperative, no apparent distress  HEAD: Normocephalic, atraumatic  EYES: No sclera icterus, pupils equal  LUNGS:  No increased work of breathing on room air  CARDIOVASCULAR:  Tachycardic and normotensive  ABDOMEN:  Soft, appropriate TTP around incisions, incisions c/d/i, non-distended  EXTREMITIES: No edema or swelling  SKIN: Warm and dry    Assessment/Plan:  71 y.o. male with gastric adenocarcinoma of the lesser curve & paraesophageal hernia s/p subtotal gastrectomy with Ellis-en-Y reconstruction & paraesophageal hernia repair 11/22    - Continue CLD  - IVFs  - Pain control PRN  - Encourage ambulation and incentive spirometer  - Wean O2 as able  - Tachycardia likely related to pain, continue to monitor  - Await return of bowel function, once passing flatus, can advance to FLD    Electronically signed by John Esqueda MD on 11/23/2022 at 7:01 AM    Doing well  Pain controlled  Labs as expected postop  Tolerating clear liquids - advance to fulls as nausea improved and passed flatus  Home likely tonight vs tomorrow    Alexis Smith MD

## 2022-11-23 NOTE — PLAN OF CARE
Problem: Discharge Planning  Goal: Discharge to home or other facility with appropriate resources  11/22/2022 2258 by Latasha Piedra RN  Outcome: Progressing     Problem: Pain  Goal: Verbalizes/displays adequate comfort level or baseline comfort level  11/22/2022 2258 by Latasha Piedra RN  Outcome: Progressing     Problem: Safety - Adult  Goal: Free from fall injury  11/22/2022 2258 by Latasha Piedra RN  Outcome: Progressing     Problem: ABCDS Injury Assessment  Goal: Absence of physical injury  11/22/2022 2258 by Latasha Piedra RN  Outcome: Progressing

## 2022-11-23 NOTE — DISCHARGE INSTRUCTIONS
Your information:  Name: Pallavi Christopher  : 1950    Your instructions:    Discharge home. Follow up with primary care provider and specialists as directed. Information about your newly prescribed medications has been included in your discharge paperwork. Continue full liquid diet for two weeks. Surgical Instructions:  1. May shower daily with Dial soap and water. Pat incisions dry. No lotions, creams or powders. No tub baths/hot tubs or pools. 2. No lifting [id number]; no pushing/pulling. No driving until after seen by surgeon in the office. May go up and down a flight of stairs 1-2X daily. Walk at least 3x daily. No sexual activity until after seen in the office. 3.  Okay for full liquid for 2 weeks    4. Take all medications as listed on your Discharge Instruction sheet    5. Call Your Doctor If Any of the Following Occurs   Signs of infection, including fever and chills   Redness, swelling, increasing pain, excessive bleeding, or discharge at the incision site   Cough, shortness of breath, chest pain   Increased abdominal pain   Nausea and/or vomiting that does not resolve off narcotics. Pain, burning, urgency or frequency of urination, or blood in the urine   Pain and/or swelling in your feet, calves, or legs   Dark urine, light stools, or evidence of jaundice (yellowing of the skin or eyes). Call 911 anytime you think you may need emergency care. For example, call if:    You passed out (lost consciousness). You are short of breath. Call your doctor now or seek immediate medical care if:    You are sick to your stomach or cannot keep fluids down. You have pain that does not get better after you take pain medicine. You have signs of infection, such as: Increased pain, swelling, warmth, or redness. Red streaks leading from the wound. Pus draining from the wound. A fever. You have loose stitches, or your incision comes open.      You have signs of a blood clot in your leg (called a deep vein thrombosis), such as:  Pain in your calf, back of the knee, thigh, or groin. Redness and swelling in your leg or groin. You cannot pass stool or gas. Bright red blood has soaked through your bandage. Watch closely for any changes in your health, and be sure to contact your doctor if you have any problems. 6. Follow up with Dr. Sylvester Weheler in 2 weeks, please call his office upon discharge to schedule an appointment. The following personal items were collected during your admission and were returned to you:    Belongings  Dental Appliances: None  Vision - Corrective Lenses: None  Hearing Aid: None  Clothing: Footwear, Pants, Shirt, Undergarments  Jewelry: None  Body Piercings Removed: No  Electronic Devices: None  Weapons (Notify Protective Services/Security): None  Other Valuables: At home  Home Medications: None  Valuables Given To: Family (Comment)  Provide Name(s) of Who Valuable(s) Were Given To: [place]  Responsible person(s) in the waiting room: [place]  Patient approves for provider to speak to responsible person post operatively: Yes    Information obtained by:  By signing below, I understand that if any problems occur once I leave the hospital I am to contact Dr. Sylvester Wheeler. I understand and acknowledge receipt of the instructions indicated above.

## 2022-11-24 VITALS
HEART RATE: 105 BPM | SYSTOLIC BLOOD PRESSURE: 138 MMHG | TEMPERATURE: 98.4 F | BODY MASS INDEX: 24.8 KG/M2 | WEIGHT: 158 LBS | HEIGHT: 67 IN | DIASTOLIC BLOOD PRESSURE: 70 MMHG | RESPIRATION RATE: 16 BRPM | OXYGEN SATURATION: 96 %

## 2022-11-24 PROCEDURE — 2580000003 HC RX 258: Performed by: SURGERY

## 2022-11-24 PROCEDURE — C9113 INJ PANTOPRAZOLE SODIUM, VIA: HCPCS | Performed by: SURGERY

## 2022-11-24 PROCEDURE — A4216 STERILE WATER/SALINE, 10 ML: HCPCS | Performed by: SURGERY

## 2022-11-24 PROCEDURE — 6360000002 HC RX W HCPCS: Performed by: SURGERY

## 2022-11-24 PROCEDURE — 6370000000 HC RX 637 (ALT 250 FOR IP): Performed by: SURGERY

## 2022-11-24 RX ORDER — TRAMADOL HYDROCHLORIDE 50 MG/1
50 TABLET ORAL EVERY 4 HOURS PRN
Qty: 10 TABLET | Refills: 0 | Status: SHIPPED | OUTPATIENT
Start: 2022-11-24 | End: 2022-11-27

## 2022-11-24 RX ADMIN — SODIUM CHLORIDE, POTASSIUM CHLORIDE, SODIUM LACTATE AND CALCIUM CHLORIDE: 600; 310; 30; 20 INJECTION, SOLUTION INTRAVENOUS at 10:22

## 2022-11-24 RX ADMIN — TRAMADOL HYDROCHLORIDE 50 MG: 50 TABLET, COATED ORAL at 08:17

## 2022-11-24 RX ADMIN — PROMETHAZINE HYDROCHLORIDE 6.25 MG: 25 INJECTION INTRAMUSCULAR; INTRAVENOUS at 01:57

## 2022-11-24 RX ADMIN — PROMETHAZINE HYDROCHLORIDE 6.25 MG: 25 INJECTION INTRAMUSCULAR; INTRAVENOUS at 15:21

## 2022-11-24 RX ADMIN — SODIUM CHLORIDE 40 MG: 9 INJECTION, SOLUTION INTRAMUSCULAR; INTRAVENOUS; SUBCUTANEOUS at 08:17

## 2022-11-24 RX ADMIN — TRAMADOL HYDROCHLORIDE 50 MG: 50 TABLET, COATED ORAL at 14:32

## 2022-11-24 ASSESSMENT — PAIN DESCRIPTION - ORIENTATION: ORIENTATION: RIGHT

## 2022-11-24 ASSESSMENT — PAIN SCALES - GENERAL: PAINLEVEL_OUTOF10: 7

## 2022-11-24 ASSESSMENT — PAIN DESCRIPTION - LOCATION: LOCATION: ABDOMEN

## 2022-11-24 ASSESSMENT — PAIN DESCRIPTION - DESCRIPTORS: DESCRIPTORS: GNAWING;ACHING

## 2022-11-24 NOTE — PROGRESS NOTES
GENERAL SURGERY  DAILY PROGRESS NOTE  11/24/2022    Subjective  Awake and alert, some right sided abdominal pain over the large incision. Breathing fine, tolerating the liquid diet, no nausea or emesis. Walking well in the halls. Passing flatus, but no BM. Objective:  /70   Pulse (!) 105   Temp 98.4 °F (36.9 °C) (Oral)   Resp 16   Ht 5' 7\" (1.702 m)   Wt 158 lb (71.7 kg)   SpO2 96%   BMI 24.75 kg/m²     GENERAL:  Laying in bed, awake, alert, cooperative, no apparent distress  HEAD: Normocephalic, atraumatic  EYES: No sclera icterus, pupils equal  LUNGS:  No increased work of breathing on room air  CARDIOVASCULAR:  Tachycardic and normotensive  ABDOMEN:  Soft, appropriate TTP around incisions, incisions c/d/i, non-distended  EXTREMITIES: No edema or swelling  SKIN: Warm and dry    Assessment:  71 y.o. male s/p subtotal gastrectomy with Ellis-n-Y reconstruction & paraesophageal hernia repair 11/22/22 for gastric adenocarcinoma   Says he feels up to going home today. Plan:  Ok to discharge on full liquid diet for 2 weeks. Continue ambulation and incentive spirometer  Stool softeners and laxative to make the bowels move.     Electronically signed by Michelle Hernandez MD on 11/24/2022 at 11:32 AM

## 2022-11-28 NOTE — DISCHARGE SUMMARY
Physician Discharge Summary     Cy Londono  [id number]    Admit date: 11/22/2022    Discharge date and time: 11/24/2022  3:26 PM     Admitting Physician: Jodie Cloud MD     Admission Diagnoses: Malignant neoplasm of fundus of stomach ([address] [C16.1]  S/P subtotal gastrectomy [Z90.3]    Discharge diagnosis: same    Condition at discharge: stable        Hospital Course: Had uncomplicated lap subtotal gastrectomy with olimpia en y reconstruction and uncomplicated post operative course and was discharged tolerating a liquid diet, having good bowel function, ambulating independently and with adequate analgesia. Discharge Exam: /70   Pulse (!) 105   Temp 98.4 °F (36.9 °C) (Oral)   Resp 16   Ht 5' 7\" (1.702 m)   Wt 158 lb (71.7 kg)   SpO2 96%   BMI 24.75 kg/m²     General appearance: alert, appears stated age and cooperative  Head: Normocephalic, without obvious abnormality, atraumatic  Neck: no adenopathy, no carotid bruit, no JVD, supple, symmetrical, trachea midline and thyroid not enlarged, symmetric, no tenderness/mass/nodules  Lungs: clear to auscultation bilaterally  Heart: regular rate and rhythm, S1, S2 normal, no murmur, click, rub or gallop  Abdomen: soft, non-tender; bowel sounds normal; no masses,  no organomegaly and incisions clean and dry  Extremities: extremities normal, atraumatic, no cyanosis or edema      Disposition: home    Patient Instructions: Activity: activity as tolerated  Diet: liquid diet  Wound Care: keep wound clean and dry    Follow-up with Dr. Fuad Hernandez in 2 weeks. Over 30 min spent preparing discharge and discussing it and follow up instructions with patient.   Signed:  Jodie Cloud MD  11/28/2022  8:48 AM

## 2022-12-05 ENCOUNTER — OFFICE VISIT (OUTPATIENT)
Dept: SURGERY | Age: 72
End: 2022-12-05

## 2022-12-05 VITALS
WEIGHT: 155 LBS | HEIGHT: 67 IN | TEMPERATURE: 101.5 F | RESPIRATION RATE: 18 BRPM | SYSTOLIC BLOOD PRESSURE: 134 MMHG | DIASTOLIC BLOOD PRESSURE: 81 MMHG | HEART RATE: 128 BPM | BODY MASS INDEX: 24.33 KG/M2

## 2022-12-05 DIAGNOSIS — Z09 POSTOPERATIVE EXAMINATION: Primary | ICD-10-CM

## 2022-12-05 PROCEDURE — 99024 POSTOP FOLLOW-UP VISIT: CPT | Performed by: SURGERY

## 2022-12-05 NOTE — PROGRESS NOTES
Surgery Progress Note            Chief complaint:   Patient Active Problem List   Diagnosis    Malignant neoplasm of fundus of stomach (HCC)    S/P subtotal gastrectomy       S: doing well    O:   Vitals:    12/05/22 1337   BP: 134/81   Pulse: (!) 128   Resp: 18   Temp: (!) 101.5 °F (38.6 °C)     No intake or output data in the 24 hours ending 12/05/22 1405        Labs:  No results for input(s): WBC, HGB, HCT in the last 72 hours. Invalid input(s): PLR  Lab Results   Component Value Date    CREATININE 1.2 11/23/2022    BUN 17 11/23/2022     11/23/2022    K 4.5 11/23/2022    CL 99 11/23/2022    CO2 27 11/23/2022     No results for input(s): LIPASE, AMYLASE in the last 72 hours.     Physical exam:   /81   Pulse (!) 128   Temp (!) 101.5 °F (38.6 °C) (Temporal)   Resp 18   Ht 5' 7\" (1.702 m)   Wt 155 lb (70.3 kg)   BMI 24.28 kg/m²   General appearance: NAD  Head: NCAT  Neck: supple, no masses  Lungs: equal chest rise bilateral  Heart: S1S2 present  Abdomen: soft, nontender, nondistended  Skin; no new lesions, incisions clean and intact  Gu: no cva tenderness  Extremities: extremities normal, atraumatic, no cyanosis or edema    A:  Post op lap partial gastrectomy with olimpia en y reconstruction    P: follow up with oncology    Cynthia Levy MD, MD  12/5/2022

## 2025-05-29 NOTE — PROGRESS NOTES
Internal Medicine Progress Note    [facility]    Jaswantem Morel. Ayden Nava., SHASHIONIKO. Todd Gilliam D.O., JIMMIE Frost D.O. Gamaliel Haro D.O. Marie Mcdonough, MSN, APRN, NP-C  Corie Ayala. Whitney Boucher, MSN, APRN-CNP       Primary Care Physician: Lauryn Marina DO   Admitting Physician:  Messi Nielsen MD  Admission date and time: 11/22/2022  7:02 AM    Room:  [address] [place] [address]  Admitting diagnosis: Malignant neoplasm of fundus of stomach ([facility] [address] [C16.1]  S/P subtotal gastrectomy [Z90.3]    Patient Name: Joe Beckwith  MRN: [id number]    Date of Service: 11/24/2022     Covering for Dr. Mayte Rodríguez:  John Jolley is a 71 y.o. male who was seen and examined today,11/24/2022, at the bedside. Patient appears clinically improved. Anxious for possible discharge today. Tolerating diet. Passing flatus and ambulating but no bowel movements    No family present during my examination. Review of System:   Constitutional:   Denies fever or chills, weight loss or gain, fatigue or malaise. HEENT:   Denies ear pain, sore throat, sinus or eye problems. Cardiovascular:   Denies any chest pain, irregular heartbeats, or palpitations. Respiratory:   Denies shortness of breath, coughing, sputum production, hemoptysis, or wheezing. Gastrointestinal:   Passing flatus. Genitourinary:    Denies any urgency, frequency, hematuria. Voiding  without difficulty. Extremities:   Denies lower extremity swelling, edema or cyanosis. Neurology:    Denies any headache or focal neurological deficits, Denies generalized weakness or memory difficulty. Psch:   Denies being anxious or depressed. Musculoskeletal:    Denies  myalgias, joint complaints or back pain. Integumentary:   Denies any rashes, ulcers, or excoriations. Denies bruising. Hematologic/Lymphatic:  Denies bruising or bleeding. Physical Exam:  I/O this shift:   In: RN stayed with pt for first 15 minutes of blood transfusion. No adverse reactions, VS stable.   160 [P.O.:160]  Out: -     Intake/Output Summary (Last 24 hours) at 11/24/2022 1357  Last data filed at 11/24/2022 1321  Gross per 24 hour   Intake 2616.48 ml   Output --   Net 2616.48 ml   I/O last 3 completed shifts: In: 3961.5 [P.O.:300; I.V.:3479.2; IV Piggyback:182.3]  Out: 800 [Urine:800]  Patient Vitals for the past 96 hrs (Last 3 readings):   Weight   11/22/22 0715 158 lb (71.7 kg)     Vital Signs:   Blood pressure 138/70, pulse (!) 105, temperature 98.4 °F (36.9 °C), temperature source Oral, resp. rate 16, height 5' 7\" (1.702 m), weight 158 lb (71.7 kg), SpO2 96 %. General appearance:  Alert, responsive, oriented to person, place, and time. Well preserved, alert, no distress. Head:  Normocephalic. No masses, lesions or tenderness. Eyes:  PERRLA. EOMI. Sclera clear. Buccal mucosa moist.  ENT:  Ears normal. Mucosa normal.  Neck:    Supple. Trachea midline. No thyromegaly. No JVD. No bruits. Heart:    Rhythm regular. Rate controlled. No murmurs. Lungs:    Symmetrical. Clear to auscultation bilaterally. No wheezes. No rhonchi. No rales. Abdomen:   Postsurgical findings  Extremities:    Peripheral pulses present. No peripheral edema. No ulcers. No cyanosis. No clubbing. Neurologic:    Alert x 3. No focal deficit. Cranial nerves grossly intact. No focal weakness. Psych:   Behavior is normal. Mood appears normal. Speech is not rapid and/or pressured. Musculoskeletal:   Spine ROM normal. Muscular strength intact. Gait not assessed. Integumentary:  No rashes  Skin normal color and texture.   Genitalia/Breast:  Deferred    Medication:  Scheduled Meds:   sodium chloride flush  5-40 mL IntraVENous 2 times per day    pantoprazole (PROTONIX) 40 mg injection  40 mg IntraVENous Daily     Continuous Infusions:   sodium chloride      lactated ringers 100 mL/hr at 11/24/22 1022       Objective Data:  Recent Labs     11/23/22  0436   WBC 12.1*   RBC 4.39   HGB 11.8*   HCT 36.1*   MCV 82.2   MCH 26.9   MCHC 32.7   RDW 14.1      MPV 9.6     Lab Results   Component Value Date/Time     11/23/2022 04:36 AM    K 4.5 11/23/2022 04:36 AM    CL 99 11/23/2022 04:36 AM    CO2 27 11/23/2022 04:36 AM    ANIONGAP 10 11/23/2022 04:36 AM    GLUCOSE 130 11/23/2022 04:36 AM    BUN 17 11/23/2022 04:36 AM    CREATININE 1.2 11/23/2022 04:36 AM    LABGLOM >60 11/23/2022 04:36 AM    CALCIUM 8.6 11/23/2022 04:36 AM    BILITOT 0.6 11/23/2022 04:36 AM    ALT 60 11/23/2022 04:36 AM    AST 60 11/23/2022 04:36 AM    ALKPHOS 36 11/23/2022 04:36 AM    PROT 5.7 11/23/2022 04:36 AM    LABALBU 3.5 11/23/2022 04:36 AM     No results for input(s): MG in the last 720 hours. No results found for: PHOS  No results for input(s): TROPONINI in the last 72 hours. Assessment:    Status post laparoscopic subtotal gastrectomy with Ellis-en-Y reconstruction laparoscopic paraesophageal hernia repair and myocutaneous flap of the trunk by Dr. Valeria Henley on November 22          Plan:         Patient tolerating diet well  Progress as tolerated  Pending surgical evaluation possible discharge today  Follow-up with family doctor      More than 50% of my time was spent at the bedside counseling/coordinating care with the patient and/or family with face to face contact. This time was spent reviewing notes and laboratory data as well as instructing and counseling the patient. Time I spent with the family or surrogate(s) is included only if the patient was incapable of providing the necessary information or participating in medical decisions. I also discussed the differential diagnosis and all of the proposed management plans with the patient and individuals accompanying the patient. I am readily available for any further decision-making and intervention.        Briana Samuels DO, F.A.C.O.I.  11/24/2022  1:57 PM

## (undated) DEVICE — 1010 S-DRAPE TOWEL DRAPE 10/BX: Brand: STERI-DRAPE™

## (undated) DEVICE — KENDALL 450 SERIES MONITORING FOAM ELECTRODE - RECTANGULAR SHAPE ( 3/PK): Brand: KENDALL

## (undated) DEVICE — SOLUTION IRRIG 3000ML 0.9% SOD CHL USP UROMATIC PLAS CONT

## (undated) DEVICE — PLUMEPORT LAPAROSCOPIC SMOKE FILTRATION DEVICE: Brand: PLUMEPORT ACTIV

## (undated) DEVICE — 6 X 9  1.75MIL 4-WALL LABGUARD: Brand: MINIGRIP COMMERCIAL LLC

## (undated) DEVICE — PITCHER PT 1200ML W HNDL CSR WRP

## (undated) DEVICE — NDL CNTR 40CT FM MAG: Brand: MEDLINE INDUSTRIES, INC.

## (undated) DEVICE — APPLIER CLP L SHFT DIA12MM 20 ROT MULT LIGACLP

## (undated) DEVICE — STAPLER SKIN L440MM 32MM LNG 12 FIRING B FRM PWR + GRIPPING

## (undated) DEVICE — TROCAR: Brand: KII FIOS FIRST ENTRY

## (undated) DEVICE — TROCAR: Brand: KII SLEEVE

## (undated) DEVICE — FORCEPS BX L240CM JAW DIA2.8MM L CAP W/ NDL MIC MESH TOOTH

## (undated) DEVICE — GARMENT,MEDLINE,DVT,INT,CALF,MED, GEN2: Brand: MEDLINE

## (undated) DEVICE — DRAPE 40INX24IN RADIOLOGY CASS EQUIP

## (undated) DEVICE — NEEDLE SPNL 22GA L3.5IN BLK HUB S STL REG WALL FIT STYL W/

## (undated) DEVICE — LUBRICANT SURG JELLY ST BACTER TUBE 4.25OZ

## (undated) DEVICE — CLOTH SURG PREP PREOPERATIVE CHLORHEXIDINE GLUC 2% READYPREP

## (undated) DEVICE — GLOVE ORANGE PI 8   MSG9080

## (undated) DEVICE — ELECTRODE PT RET AD L9FT HI MOIST COND ADH HYDRGEL CORDED

## (undated) DEVICE — GOWN ISOLATN REG YEL M WT MULTIPLY SIDETIE LEV 2

## (undated) DEVICE — APPLICATOR SURG XL L38CM FOR ARISTA ABSRB HEMSTAT FLEXITIP

## (undated) DEVICE — COVER,LIGHT HANDLE,FLX,1/PK: Brand: MEDLINE INDUSTRIES, INC.

## (undated) DEVICE — SUTURE V-LOC 180 SZ 0 L9IN ABSRB GRN GS-21 L37MM 1/2 CIR VLOCL0346

## (undated) DEVICE — MASK,FACE,MAXFLUIDPROTECT,SHIELD/ERLPS: Brand: MEDLINE

## (undated) DEVICE — GOWN,SIRUS,NONRNF,SETINSLV,XL,20/CS: Brand: MEDLINE

## (undated) DEVICE — [HIGH FLOW INSUFFLATOR,  DO NOT USE IF PACKAGE IS DAMAGED,  KEEP DRY,  KEEP AWAY FROM SUNLIGHT,  PROTECT FROM HEAT AND RADIOACTIVE SOURCES.]: Brand: PNEUMOSURE

## (undated) DEVICE — CONTAINER SPEC COLL 960ML POLYPR TRIANG GRAD INTAKE/OUTPUT

## (undated) DEVICE — RELOAD STPL L60MM H1.5-3.6MM REG TISS BLU GRIPPING SURF B

## (undated) DEVICE — SPONGE GZ 4IN 4IN 4 PLY N WVN AVANT

## (undated) DEVICE — TUBING, SUCTION, 1/4" X 10', STRAIGHT: Brand: MEDLINE

## (undated) DEVICE — KIT BEDSIDE REVITAL OX 500ML

## (undated) DEVICE — RELOAD STPL L60MM H1-2.6MM MESENTERY THN TISS WHT 6 ROW

## (undated) DEVICE — MASK O2 AD TB L7FT HI CONC PART N RBRTH W RESVR BG SFTY

## (undated) DEVICE — APPLICATOR MEDICATED 26 CC SOLUTION HI LT ORNG CHLORAPREP

## (undated) DEVICE — LENS CORD 0-DEG 10 MM

## (undated) DEVICE — SHEARS LAP L45CM DIA5MM ULTRASONIC CRV TIP ADV HEMSTAS HARM

## (undated) DEVICE — SOLUTION IRRIG 250ML STRL H2O PLAS POUR BTL USP

## (undated) DEVICE — SOLUTION IRRIG 1000ML 0.9% SOD CHL USP POUR PLAS BTL

## (undated) DEVICE — Device: Brand: DEFENDO VALVE AND CONNECTOR KIT

## (undated) DEVICE — BLOCK BITE 60FR CAREGUARD

## (undated) DEVICE — TOWEL,OR,DSP,ST,BLUE,STD,6/PK,12PK/CS: Brand: MEDLINE

## (undated) DEVICE — SKIN AFFIX SURG ADHESIVE 72/CS 0.55ML: Brand: MEDLINE

## (undated) DEVICE — SYRINGE MED 10ML TRNSLUC BRL PLUNG BLK MRK POLYPR CTRL

## (undated) DEVICE — ACCESS PLATFORM FOR MINIMALLY INVASIVE SURGERY: Brand: GELPOINT® ADVANCED ACCESS PLATFORM

## (undated) DEVICE — SUTURE ABSRB L6IN L37MM 0 GS-21 GRN 1/2 CIR TAPR PNT NDL VLOCL0306

## (undated) DEVICE — SYRINGE 20ML LL S/C 50

## (undated) DEVICE — GLOVE ORANGE PI 7 1/2   MSG9075

## (undated) DEVICE — PUMP SUC IRR TBNG L10FT W/ HNDPC ASSEMB STRYKEFLOW 2

## (undated) DEVICE — LAPAROSCOPIC SCISSORS: Brand: EPIX LAPAROSCOPIC SCISSORS

## (undated) DEVICE — COVER,TABLE,44X90,STERILE: Brand: MEDLINE

## (undated) DEVICE — PMI PTFE COATED LAPAROSCOPIC WIRE L-HOOK 44 CM: Brand: PMI

## (undated) DEVICE — DOUBLE BASIN SET: Brand: MEDLINE INDUSTRIES, INC.

## (undated) DEVICE — PACK SURG LAP CHOLE CUSTOM

## (undated) DEVICE — MARKER,SKIN,WI/RULER AND LABELS: Brand: MEDLINE

## (undated) DEVICE — NEEDLE HYPO 25GA L1.5IN BLU POLYPR HUB S STL REG BVL STR